# Patient Record
Sex: MALE | Race: WHITE | Employment: OTHER | URBAN - METROPOLITAN AREA
[De-identification: names, ages, dates, MRNs, and addresses within clinical notes are randomized per-mention and may not be internally consistent; named-entity substitution may affect disease eponyms.]

---

## 2017-01-10 ENCOUNTER — COMMUNICATION - HEALTHEAST (OUTPATIENT)
Dept: FAMILY MEDICINE | Facility: CLINIC | Age: 82
End: 2017-01-10

## 2017-01-23 ENCOUNTER — MYC MEDICAL ADVICE (OUTPATIENT)
Dept: FAMILY MEDICINE | Facility: CLINIC | Age: 82
End: 2017-01-23

## 2017-01-23 ENCOUNTER — MYC REFILL (OUTPATIENT)
Dept: FAMILY MEDICINE | Facility: CLINIC | Age: 82
End: 2017-01-23

## 2017-01-23 DIAGNOSIS — F33.2 MAJOR DEPRESSIVE DISORDER, RECURRENT, SEVERE WITHOUT PSYCHOTIC FEATURES (H): ICD-10-CM

## 2017-01-23 DIAGNOSIS — F33.2 MAJOR DEPRESSIVE DISORDER, RECURRENT, SEVERE WITHOUT PSYCHOTIC FEATURES (H): Primary | ICD-10-CM

## 2017-01-23 RX ORDER — METHYLPHENIDATE HYDROCHLORIDE 10 MG/1
10 TABLET ORAL 2 TIMES DAILY
Qty: 60 TABLET | Refills: 0 | Status: SHIPPED | OUTPATIENT
Start: 2017-01-23 | End: 2017-03-10

## 2017-01-23 RX ORDER — METHYLPHENIDATE HYDROCHLORIDE 10 MG/1
10 TABLET ORAL 2 TIMES DAILY
Qty: 60 TABLET | Refills: 0 | Status: CANCELLED | OUTPATIENT
Start: 2017-01-23

## 2017-01-23 NOTE — TELEPHONE ENCOUNTER
Message from MyChart:  Original authorizing provider: MD Felix Dumont would like a refill of the following medications:  methylphenidate (RITALIN) 10 MG tablet [Torres Mckeon MD]    Preferred pharmacy: [id number] - [place], [place] - [address] AT [facility]    Comment:

## 2017-02-27 DIAGNOSIS — F33.2 MAJOR DEPRESSIVE DISORDER, RECURRENT, SEVERE WITHOUT PSYCHOTIC FEATURES (H): ICD-10-CM

## 2017-03-10 RX ORDER — METHYLPHENIDATE HYDROCHLORIDE 10 MG/1
10 TABLET ORAL 2 TIMES DAILY
Qty: 60 TABLET | Refills: 0 | Status: SHIPPED | OUTPATIENT
Start: 2017-03-10 | End: 2017-04-10

## 2017-03-10 NOTE — TELEPHONE ENCOUNTER
[facility] phone call message- general phone call:    Reason for call: Patient calling regarding his prescription for Ritalin, methylphenidate. Patient stated he is unable to get out of bed and that his son Isrrael will pick it up when it is ready. Shana ADDISON was informed.     Return call needed: Yes    OK to leave a message on voice mail? Yes    Primary language: English      needed? No    Call taken on March 10, 2017 at 9:44 AM by Tala Stark

## 2017-03-10 NOTE — TELEPHONE ENCOUNTER
Controlled Substance Printed Prescription Pick-Up  Date: 03/10/17  Time: 1:15pm  Epic prescription number: [id number]  Medication name: Methylphenidate   Strength: 10mg  Prescription quantity: 60  Prescription picked up by: Isrrael Bangura     Relation to patient: son  Photo ID verified: no (No ID and pt already called to explain about it)  Second staff initial completed by: Deysi Santiago signature

## 2017-03-17 ENCOUNTER — COMMUNICATION - HEALTHEAST (OUTPATIENT)
Dept: FAMILY MEDICINE | Facility: CLINIC | Age: 82
End: 2017-03-17

## 2017-03-17 DIAGNOSIS — I10 ESSENTIAL HYPERTENSION WITH GOAL BLOOD PRESSURE LESS THAN 140/90: ICD-10-CM

## 2017-04-10 ENCOUNTER — MYC MEDICAL ADVICE (OUTPATIENT)
Dept: FAMILY MEDICINE | Facility: CLINIC | Age: 82
End: 2017-04-10

## 2017-04-10 DIAGNOSIS — F33.2 MAJOR DEPRESSIVE DISORDER, RECURRENT, SEVERE WITHOUT PSYCHOTIC FEATURES (H): ICD-10-CM

## 2017-04-11 RX ORDER — METHYLPHENIDATE HYDROCHLORIDE 10 MG/1
10 TABLET ORAL 2 TIMES DAILY
Qty: 60 TABLET | Refills: 0 | Status: SHIPPED | OUTPATIENT
Start: 2017-04-11 | End: 2017-05-16

## 2017-04-11 RX ORDER — METHYLPHENIDATE HYDROCHLORIDE 10 MG/1
10 TABLET ORAL 2 TIMES DAILY
Qty: 60 TABLET | Refills: 0 | Status: SHIPPED | OUTPATIENT
Start: 2017-04-11 | End: 2017-04-11

## 2017-05-16 ENCOUNTER — OFFICE VISIT (OUTPATIENT)
Dept: FAMILY MEDICINE | Facility: CLINIC | Age: 82
End: 2017-05-16

## 2017-05-16 VITALS
WEIGHT: 181 LBS | BODY MASS INDEX: 25.6 KG/M2 | TEMPERATURE: 97.4 F | SYSTOLIC BLOOD PRESSURE: 101 MMHG | RESPIRATION RATE: 18 BRPM | DIASTOLIC BLOOD PRESSURE: 67 MMHG | HEART RATE: 74 BPM | OXYGEN SATURATION: 95 %

## 2017-05-16 DIAGNOSIS — I10 ESSENTIAL HYPERTENSION: ICD-10-CM

## 2017-05-16 DIAGNOSIS — I35.0 AORTIC VALVE STENOSIS, UNSPECIFIED ETIOLOGY: ICD-10-CM

## 2017-05-16 DIAGNOSIS — F33.2 MAJOR DEPRESSIVE DISORDER, RECURRENT, SEVERE WITHOUT PSYCHOTIC FEATURES (H): Primary | ICD-10-CM

## 2017-05-16 DIAGNOSIS — Z23 NEED FOR PROPHYLACTIC VACCINATION WITH STREPTOCOCCUS PNEUMONIAE (PNEUMOCOCCUS) AND INFLUENZA VACCINES: ICD-10-CM

## 2017-05-16 DIAGNOSIS — C61 MALIGNANT NEOPLASM OF PROSTATE (H): ICD-10-CM

## 2017-05-16 DIAGNOSIS — R45.84 ANHEDONIA: ICD-10-CM

## 2017-05-16 DIAGNOSIS — R62.7 FAILURE TO THRIVE IN ADULT: ICD-10-CM

## 2017-05-16 LAB
ANION GAP SERPL CALCULATED.3IONS-SCNC: 15 MMOL/L (ref 5–18)
BUN SERPL-MCNC: 22 MG/DL (ref 8–28)
CALCIUM SERPL-MCNC: 9.2 MG/DL (ref 8.5–10.5)
CHLORIDE SERPL-SCNC: 102 MMOL/L (ref 98–107)
CO2 SERPL-SCNC: 18 MMOL/L (ref 22–31)
CREAT SERPL-MCNC: 1.13 MG/DL (ref 0.7–1.3)
GLUCOSE SERPL-MCNC: 132 MG/DL (ref 70–125)
POTASSIUM SERPL-SCNC: 4.3 MMOL/L (ref 3.5–5)
PSA SERPL-MCNC: 3 NG/ML (ref 0–6.5)
SODIUM SERPL-SCNC: 135 MMOL/L (ref 136–145)

## 2017-05-16 RX ORDER — METHYLPHENIDATE HYDROCHLORIDE 10 MG/1
10 TABLET ORAL 2 TIMES DAILY
Qty: 62 TABLET | Refills: 0 | Status: SHIPPED | OUTPATIENT
Start: 2017-05-16 | End: 2017-06-16

## 2017-05-16 RX ORDER — LOSARTAN POTASSIUM 50 MG/1
50 TABLET ORAL DAILY
Qty: 90 TABLET | Refills: 3 | Status: SHIPPED | OUTPATIENT
Start: 2017-05-16 | End: 2018-05-24

## 2017-05-16 RX ORDER — METHYLPHENIDATE HYDROCHLORIDE 10 MG/1
10 TABLET ORAL 2 TIMES DAILY
Qty: 62 TABLET | Refills: 0 | Status: SHIPPED | OUTPATIENT
Start: 2017-07-16 | End: 2017-08-22

## 2017-05-16 RX ORDER — METHYLPHENIDATE HYDROCHLORIDE 10 MG/1
10 TABLET ORAL 2 TIMES DAILY
Qty: 62 TABLET | Refills: 0 | Status: SHIPPED | OUTPATIENT
Start: 2017-06-16 | End: 2017-07-17

## 2017-05-16 NOTE — PROGRESS NOTES
"ROS  CONSTITUTIONAL:  NEGATIVE for fever, chills, change in weight  RESP:  NEGATIVE for significant cough or SOB  CV:  NEGATIVE for chest pain, palpitations or peripheral edema  GI:  NEGATIVE for nausea, abdominal pain, heartburn, or change in bowel habits  : NEGATIVE for dysuria, hematuria, and increased frequency.    PMH, Fam Hx, and Social Hx: Reviewed  Family History   Problem Relation Age of Onset     CANCER Father      skin     EYE* Mother      Glaucoma     Cardiovascular Sister        Vital Signs: Reviewed  Blood pressure (!) 80/60, pulse 75, temperature 97.4  F (36.3  C), temperature source Oral, resp. rate 18, weight 181 lb (82.1 kg), SpO2 95 %.  Body mass index is 25.6 kg/(m^2).     SUBJECTIVE:  This is an 83-yeare-old male in for chronic disease management.  He has major depressive disorder and anhedonia.  He has a history of hypertension but his blood pressure is very low.  He has prostate cancer.  His urologist would like a PSA checked.  He also has aortic valve stenosis that has been recently checked by his history through Cardiology and was told he does not need a valve replacement.   He continues to have \"lack of will.\"  He spends much of his day in bed and does not do any exercise.  He continues to use the methylphenidate or Ritalin 10 mg twice daily.  His home blood pressures run 130/90 on a consistent basis and he did not want me to reduce his medications.  He is on two prostate medications for prostate hypertrophy, Proscar and Flomax and would like him to check with his urologist to see if he should stay on those drugs.   OBJECTIVE:   APPEARANCE:  No acute distress, very flat affect and he gets angry easily when we start to discuss his blood pressure being too low.   NECK:  No jugular venous distention.   LUNGS:  Clear.   CARDIOVASCULAR:  Systolic ejection murmur consistent with his diagnosis of aortic stenosis.   ABDOMEN:  Soft, nontender.   EXTREMITIES:  Trace edema.   ASSESSMENT:   1.  Major " depressive disorder.   2.  Anhedonia.   3.  Hypertension, treated with low blood pressure today.     4.  Prostate cancer.   5.  Failure to thrive.   6.  Stable aortic stenosis.   7.  Health maintenance.     PLAN:  We will check a BMP today to see how his electrolytes and renal function are coming along and reduced his Cozaar to 50 mg daily.  I would like to discontinue all his blood pressure medications if he does not have a good record of elevated blood pressures from home.  I would like him to come back in 4-6 weeks with his home records.  If he starts getting dizzy he should call as we will need to reduce his blood pressure medications.  Will recheck it before he leaves today.  I have tried to update his medication list.  Will consider reducing or discontinuing his Cozaar, metoprolol, amlodipine and finasteride and tamsulosin.   I've refilled his Ritalin for the next 3 months and I have given him three one month prescriptions as he thinks it will work better for him rather than calling in monthly.  He continues with his fluoxetine.  I have asked him to check with his urologist to see if he should still be on both Proscar and Flomax.   I have referred him to a mental health group to send him to Psych to investigate transcranial magnetic stimulation which is something he is requesting.  He has found something on the [facility] website and thinks this may help his memory and depression.     The patient involved in medical decision making throughout the visit.   Will reevaluate in 4-6 weeks for his blood pressure.

## 2017-05-16 NOTE — MR AVS SNAPSHOT
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  We can check your machine readings with ours to be sure your machine is still accurate.    Medications ~  I am cutting your Cozaar medications dose in half and have discontinued Trazodone.  I have given you three prescriptions for your Ritalin to take to your pharmacy once a month for the next three months.     Lab Tests ~ Will order a PSA for [facility] as well as a test for the electrolytes in your blood.    Check with your urologist if you should be on both Proscar and Flomax.  Blood pressure was quite low today.    Your medication list is printed, please keep this with you, it is helpful to bring this current list to any other medical appointments, the emergency room or hospital.    If you had lab testing today and your results are reassuring or normal they will be be mailed to you within 7 days.     If the lab tests need quick action we will call you with the results.   The phone number we will call with results is # [phone] (home) . If this is not the best number please call our clinic and change the number.    If you need any refills please  call your pharmacy and they will contact us.    If you have any further concerns or wish to schedule another appointment you must call our office during normal business hours  [phone] (8-5:00 M-F)  If you have urgent medical questions that cannot wait  you may also call [phone] at any time of day.  If you have a medical emergency please call 911.    Thank you for coming to [facility].    PSA and BMP lab result faxed to [facility] on 5/19/17.  CI, CMA          Follow-ups after your visit        Additional Services     Mental Health Referral - [facility]       Use this form for behavioral health consults and assessments. The referral coordinator will help to determine whether patients are best served by clinic behavioral health staff or by community providers.    Type of referral(s) requested (indicate all that apply):  Adult Psychiatry--for diagnosis and medication management    Reason for referral: memory loss, would like to see if transcranial magnetic stimulation would help him.      Currently receiving mental health services (if 'Yes', what services and why today's referral?): No, was seening psychiatry. I have been prescribing his meds which have been stable. Has had some low blood pressures but states home BP is 130/90 on consistent basis    Currently having suicidal thoughts: No  Previous psych hospitalization: No    Please provide data for below screening tools if available.   PHQ-9 Score: 6  GAD7 Score: 0  PC-PTSD Score:  Bipolar Screen:  [place] (ADHD):   MCHAT (Autism Screen):   Pediatric Symptom Checklist (PSC):      needed: No  Language: English                  Who to contact     Please call your clinic at [phone] to:    Ask questions about your health    Make or cancel appointments    Discuss your medicines    Learn about your test results    Speak to your doctor   If you have compliments or concerns about an experience at your clinic, or if you wish to  file a complaint, please contact [facility] Patient Relations at [phone] or email us at [email or web address]         Additional Information About Your Visit        [email or web address] Information     [employer]ope gives you secure access to your electronic health record. If you see a primary care provider, you can also send messages to your care team and make appointments. If you have questions, please call your primary care clinic.  If you do not have a primary care provider, please call [phone] and they will assist you.      valuescope is an electronic gateway that provides easy, online access to your medical records. With [employer]cope, you can request a clinic appointment, read your test results, renew a prescription or communicate with your care team.     To access your existing account, please contact your [facility] Clinic or call [phone] for assistance.        Care EveryWhere ID     This is your Care EveryWhere ID. This could be used by other organizations to access your [facility] medical records  [id number]        Your Vitals Were     Pulse Temperature Respirations Pulse Oximetry BMI (Body Mass Index)       74 97.4  F (36.3  C) (Oral) 18 95% 25.6 kg/m2        Blood Pressure from Last 3 Encounters:   05/25/17 91/62   05/16/17 101/67   09/09/16 169/79    Weight from Last 3 Encounters:   05/25/17 177 lb (80.3 kg)   05/16/17 181 lb (82.1 kg)   04/20/16 189 lb 3.2 oz (85.8 kg)              We Performed the Following     ADMIN: Vaccine, Initial (73853)     Basic Metabolic Profile ([facility])     Mental Health Referral - [facility]     Pneumococcal vaccine 13 valent PCV13 IM (Prevnar) [08323]     PSA Diagnostic ([facility])          Today's Medication Changes          These changes are accurate as of: 5/16/17 11:59 PM.  If you have any questions, ask your nurse or doctor.               Start taking these medicines.        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 Read the directions carefully, and ask your doctor or other care provider to review them with you.      These medicines have changed or have updated prescriptions.        Dose/Directions    losartan 50 MG tablet   Commonly known as:  COZAAR   This may have changed:    - medication strength  - how much to take  - how to take this  - when to take this  - additional instructions   Used for:  Essential hypertension   Changed by:  Torres Mckeon MD        Dose:  50 mg   Take 1 tablet (50 mg) by mouth daily   Quantity:  90 tablet   Refills:  3         Stop taking these medicines if you haven't already. Please contact your care team if you have questions.     traZODone 50 MG tablet   Commonly known as:  DESYREL   Stopped by:  Torres Mckeon MD                Where to get your medicines      These [id number] - [place], [place] - [address] AT [facility]  [facility] [address]  [address], [place] [address]     Phone:  [phone]     losartan 50 MG tablet         Some of these will need a paper prescription and others can be bought over the counter.  Ask your nurse if you have questions.     Bring a paper prescription for each of these medications     methylphenidate 10 MG tablet    methylphenidate 10 MG tablet    methylphenidate 10 MG tablet                Primary Care Provider Office Phone # Fax #    Torres Mckeon [phone]       [facility] [address] [place] [id number]        Thank you!     Thank you for choosing [facility]  for your care. Our goal is always to provide you with excellent care. Hearing back from our patients is one way we can continue to improve our services. Please take a few minutes to complete the written survey that you may receive in the mail after your visit with us. Thank you!             Your Updated Medication List - Protect others around you: Learn how to safely use, store and throw away your medicines at www.disposemymeds.org.          This list is accurate as of: 5/16/17 11:59 PM.  Always use your most recent med list.                   Brand Name Dispense Instructions for use    amLODIPine 5 MG tablet    NORVASC    90 tablet    Take 1 tablet (5 mg) by mouth daily       finasteride 5 MG tablet    PROSCAR    90 tablet    Take 1 tablet (5 mg) by mouth daily       FLUoxetine 40 MG capsule    PROzac    90 capsule    Take 1 capsule (40 mg) by mouth daily       losartan 50 MG tablet    COZAAR    90 tablet    Take 1 tablet (50 mg) by mouth daily       * methylphenidate 10 MG tablet    RITALIN    62 tablet    Take 1 tablet (10 mg) by mouth 2 times daily       * methylphenidate 10 MG tablet   Start taking on:  6/16/2017    RITALIN    62 tablet    Take 1 tablet (10 mg) by mouth 2 times daily       * methylphenidate 10 MG tablet   Start taking on:  7/16/2017    RITALIN    62  tablet    Take 1 tablet (10 mg) by mouth 2 times daily       metoprolol 25 MG 24 hr tablet    TOPROL-XL    90 tablet    Take 1 tablet (25 mg) by mouth daily       nitroglycerin 0.4 MG sublingual tablet    NITROSTAT    25 tablet    Place 1 tablet (0.4 mg) under the tongue every 5 minutes as needed for chest pain       tamsulosin 0.4 MG capsule    FLOMAX    90 capsule    Take 1 tab by mouth daily. (pharmacy - please fill 90 day supply)       * Notice:  This list has 3 medication(s) that are the same as other medications prescribed for you. Read the directions carefully, and ask your doctor or other care provider to review them with you.

## 2017-05-16 NOTE — PATIENT INSTRUCTIONS
Blood Pressure ~ Please keep a record of your blood pressures over the next month and bring to clinic along with your blood pressure machine.  We can check your machine readings with ours to be sure your machine is still accurate.    Medications ~  I am cutting your Cozaar medications dose in half and have discontinued Trazodone.  I have given you three prescriptions for your Ritalin to take to your pharmacy once a month for the next three months.     Lab Tests ~ Will order a PSA for [facility] as well as a test for the electrolytes in your blood.    Check with your urologist if you should be on both Proscar and Flomax.  Blood pressure was quite low today.    Your medication list is printed, please keep this with you, it is helpful to bring this current list to any other medical appointments, the emergency room or hospital.    If you had lab testing today and your results are reassuring or normal they will be be mailed to you within 7 days.     If the lab tests need quick action we will call you with the results.   The phone number we will call with results is # [phone] (home) . If this is not the best number please call our clinic and change the number.    If you need any refills please call your pharmacy and they will contact us.    If you have any further concerns or wish to schedule another appointment you must call our office during normal business hours  [phone] (8-5:00 M-F)  If you have urgent medical questions that cannot wait  you may also call [phone] at any time of day.  If you have a medical emergency please call 511.    Thank you for coming to [facility].    PSA and BMP lab result faxed to [facility] on 5/19/17.  CI, CMA

## 2017-05-17 ASSESSMENT — PATIENT HEALTH QUESTIONNAIRE - PHQ9: SUM OF ALL RESPONSES TO PHQ QUESTIONS 1-9: 6

## 2017-05-18 ENCOUNTER — DOCUMENTATION ONLY (OUTPATIENT)
Dept: FAMILY MEDICINE | Facility: CLINIC | Age: 82
End: 2017-05-18

## 2017-05-18 NOTE — PROGRESS NOTES
Referral for (Test): Neuropsych Testing   Location/Place/Provider: [facility], [address], [address]  [place] [address]  Date/Time: 06/26/17 at 12:00pm   Phone: [phone]  Fax: [phone]  Additional information/prep.:   Scheduled by: SCOTT Jain

## 2017-05-18 NOTE — PROGRESS NOTES
Procedure Requested        9035     Mental Health Referral - [facility] [#[id number]]         Priority: Routine  Class: External referral         Comment:Use this form for behavioral health consults and assessments. The                  referral coordinator will help to determine whether patients are                  best served by clinic behavioral health staff or by community                  providers.                                    Type of referral(s) requested (indicate all that apply):                  Adult Psychiatry--for diagnosis and medication management                                    Reason for referral: memory loss, would like to see if                   transcranial magnetic stimulation would help him.                                                      Currently receiving mental health services (if 'Yes', what                   services and why today's referral?): No, was seening psychiatry. I                   have been prescribing his meds which have been stable. Has had                   some low blood pressures but states home BP is 130/90 on                   consistent basis                                    Currently having suicidal thoughts: No                  Previous psych hospitalization: No                                    Please provide data for below screening tools if available.                   PHQ-9 Score: 6                  GAD7 Score: 0                  PC-PTSD Score:                  Bipolar Screen:                  [place] (ADHD):                   MCHAT (Autism Screen):                   Pediatric Symptom Checklist (PSC):                                      needed: No                  Language: English       Associated Diagnoses         F33.2 Major depressive disorder, recurrent, severe without psychotic          features (H)               ADDY ALMAZAN          [id number]               : 1933  M      [address]                                        PCP: [id number] LAI [facility] [id number]                              CTR: [facility]

## 2017-05-22 NOTE — PROGRESS NOTES
"Felix,  Thank you for coming to clinic and my apologies for the delay in response with your results.  I have just returned from [facility].  Your sodium is a bit low and your glucose a bit high.  We will recheck both when we check your blood pressure again next month.    Your PSA is elevated compared to the last few years but is within the \"normal\" range.  It would be good for your urologist to see this and we will forward it.    See you in June.    WR"

## 2017-05-25 ENCOUNTER — OFFICE VISIT (OUTPATIENT)
Dept: FAMILY MEDICINE | Facility: CLINIC | Age: 82
End: 2017-05-25

## 2017-05-25 VITALS
TEMPERATURE: 97.3 F | OXYGEN SATURATION: 93 % | RESPIRATION RATE: 17 BRPM | BODY MASS INDEX: 25.04 KG/M2 | WEIGHT: 177 LBS | SYSTOLIC BLOOD PRESSURE: 91 MMHG | HEART RATE: 74 BPM | DIASTOLIC BLOOD PRESSURE: 62 MMHG

## 2017-05-25 DIAGNOSIS — J20.9 ACUTE BRONCHITIS, UNSPECIFIED ORGANISM: Primary | ICD-10-CM

## 2017-05-25 RX ORDER — AZITHROMYCIN 250 MG/1
TABLET, FILM COATED ORAL
Qty: 6 TABLET | Refills: 0 | Status: SHIPPED | OUTPATIENT
Start: 2017-05-25 | End: 2017-07-06

## 2017-05-25 NOTE — MR AVS SNAPSHOT
After Visit Summary   5/25/2017    Felix Bangura    MRN: [id number]           Patient Information     Date Of Birth          11/20/1933        Visit Information        Provider Department      5/25/2017 11:20 AM Torres Mckeon MD [facility]        Today's Diagnoses     Acute bronchitis, unspecified organism    -  1      Care Instructions    Cough ~ I am going to start you on an antibiotic called Azithromycin.  You will take 2 tablets on day one and then one tablet per day for days 3 to 5.  If not better shortly after you have completed the antibiotic I will need to see you again.    Your medication list is printed, please keep this with you, it is helpful to bring this current list to any other medical appointments, the emergency room or hospital.    If you had lab testing today and your results are reassuring or normal they will be be mailed to you within 7 days.     If the lab tests need quick action we will call you with the results.   The phone number we will call with results is # [phone] (home) . If this is not the best number please call our clinic and change the number.    If you need any refills please call your pharmacy and they will contact us.    If you have any further concerns or wish to schedule another appointment you must call our office during normal business hours  [phone] (8-5:00 M-F)  If you have urgent medical questions that cannot wait  you may also call [phone] at any time of day.  If you have a medical emergency please call [phone].    Thank you for coming to [facility].            Follow-ups after your visit        Who to contact     Please call your clinic at [phone] to:    Ask questions about your health    Make or cancel appointments    Discuss your medicines    Learn about your test results    Speak to your doctor   If you have compliments or concerns about an experience at your clinic, or if you wish to file a complaint, please  contact [facility] Patient Relations at [phone] or email us at [email or web address]         Additional Information About Your Visit        [employer] Information     [facility] gives you secure access to your electronic health record. If you see a primary care provider, you can also send messages to your care team and make appointments. If you have questions, please call your primary care clinic.  If you do not have a primary care provider, please call [phone] and they will assist you.      [facility] is an electronic gateway that provides easy, online access to your medical records. With [facility], you can request a clinic appointment, read your test results, renew a prescription or communicate with your care team.     To access your existing account, please contact your [facility] or call [phone] for assistance.        Care EveryWhere ID     This is your Care EveryWhere ID. This could be used by other organizations to access your [place] medical records  [id number]        Your Vitals Were     Pulse Temperature Respirations Pulse Oximetry BMI (Body Mass Index)       74 97.3  F (36.3  C) (Oral) 17 93% 25.04 kg/m2        Blood Pressure from Last 3 Encounters:   05/25/17 91/62   05/16/17 101/67   09/09/16 169/79    Weight from Last 3 Encounters:   05/25/17 177 lb (80.3 kg)   05/16/17 181 lb (82.1 kg)   04/20/16 189 lb 3.2 oz (85.8 kg)              Today, you had the following     No orders found for display         Today's Medication Changes          These changes are accurate as of: 5/25/17 12:16 PM.  If you have any questions, ask your nurse or doctor.               Start taking these medicines.        Dose/Directions    azithromycin 250 MG tablet   Commonly known as:  ZITHROMAX   Used for:  Acute bronchitis, unspecified organism   Started by:  Torres Mckeon MD        Two tablets first day, then one tablet daily for four days.    Quantity:  6 tablet   Refills:  0            Where to get your medicines      These medications were sent to [id number] - [place], [place] - [address] AT [facility] & [address], [place] [address]     Phone:  [phone]     azithromycin 250 MG tablet                Primary Care Provider Office Phone # Fax #    Torres Mckeon [phone]       [facility] [address] [facility] [address]        Thank you!     Thank you for choosing [facility]  for your care. Our goal is always to provide you with excellent care. Hearing back from our patients is one way we can continue to improve our services. Please take a few minutes to complete the written survey that you may receive in the mail after your visit with us. Thank you!             Your Updated Medication List - Protect others around you: Learn how to safely use, store and throw away your medicines at www.disposemymeds.org.          This list is accurate as of: 5/25/17 12:16 PM.  Always use your most recent med list.                   Brand Name Dispense Instructions for use    amLODIPine 5 MG tablet    NORVASC    90 tablet    Take 1 tablet (5 mg) by mouth daily       azithromycin 250 MG tablet    ZITHROMAX    6 tablet    Two tablets first day, then one tablet daily for four days.       finasteride 5 MG tablet    PROSCAR    90 tablet    Take 1 tablet (5 mg) by mouth daily       FLUoxetine 40 MG capsule    PROzac    90 capsule    Take 1 capsule (40 mg) by mouth daily       losartan 50 MG tablet    COZAAR    90 tablet    Take 1 tablet (50 mg) by mouth daily       * methylphenidate 10 MG tablet    RITALIN    62 tablet    Take 1 tablet (10 mg) by mouth 2 times daily       * methylphenidate 10 MG tablet   Start taking on:  6/16/2017    RITALIN    62 tablet    Take 1 tablet (10 mg) by mouth 2 times daily       * methylphenidate 10 MG tablet   Start taking on:   7/16/2017    RITALIN    62 tablet    Take 1 tablet (10 mg) by mouth 2 times daily       metoprolol 25 MG 24 hr tablet    TOPROL-XL    90 tablet    Take 1 tablet (25 mg) by mouth daily       nitroglycerin 0.4 MG sublingual tablet    NITROSTAT    25 tablet    Place 1 tablet (0.4 mg) under the tongue every 5 minutes as needed for chest pain       tamsulosin 0.4 MG capsule    FLOMAX    90 capsule    Take 1 tab by mouth daily. (pharmacy - please fill 90 day supply)       * Notice:  This list has 3 medication(s) that are the same as other medications prescribed for you. Read the directions carefully, and ask your doctor or other care provider to review them with you.

## 2017-05-25 NOTE — NURSING NOTE
DUE FOR:  PHQ9 given  Advance Directive    Patient did not bring medication list or bottles to the clinic today. Unable to check dose and SIG, as patient is unsure of correct medications they are taking.  Pt was told to bring medication and or bottles to clinic for next visit to review.

## 2017-05-25 NOTE — PATIENT INSTRUCTIONS
Cough ~ I am going to start you on an antibiotic called Azithromycin.  You will take 2 tablets on day one and then one tablet per day for days 3 to 5.  If not better shortly after you have completed the antibiotic I will need to see you again.    Your medication list is printed, please keep this with you, it is helpful to bring this current list to any other medical appointments, the emergency room or hospital.    If you had lab testing today and your results are reassuring or normal they will be be mailed to you within 7 days.     If the lab tests need quick action we will call you with the results.   The phone number we will call with results is # [phone] (home) . If this is not the best number please call our clinic and change the number.    If you need any refills please call your pharmacy and they will contact us.    If you have any further concerns or wish to schedule another appointment you must call our office during normal business hours  [phone] (8-5:00 M-F)  If you have urgent medical questions that cannot wait  you may also call [phone] at any time of day.  If you have a medical emergency please call [phone].    Thank you for coming to [facility].

## 2017-05-25 NOTE — PROGRESS NOTES
Chief Complaint   Patient presents with     Cough     non-productive for some time now with fatigue     BP 91/62 (BP Location: Right arm)  Pulse 74  Temp 97.3  F (36.3  C) (Oral)  Resp 17  Wt 177 lb (80.3 kg)  SpO2 93%  BMI 25.04 kg/m2    SUBJECTIVE:  This is an 83-year-old male in for check of cough that has been increasing over the past 4-5 days.  He says it is nonproductive.  His wife says that while he is sleeping his lungs gurgle and this has been over the past 4 nights.  He has not had fever or chills.  There has been no illness in the family.  He has not been exposed to any illness that he is aware of.   He is taking fluids and continues with his current medications.   OBJECTIVE:   APPEARANCE:  No acute distress and is more interactive than he has been in some visits.  O2 sat is 93%.   CHEST:  No retractions noted.   LUNGS:  Clear, anterior and superior posterior areas on his right lower lobe posteriorly there are rales.   ASSESSMENT:     1.  Probable bronchitis.   PLAN:  Azithromycin 250 mg 2 tablets day 1 one tablet days 2 through 5.  Recheck if not resolving or if the gurgling persists at night and will consider chest x-ray.   The patient and wife involved in medical decision making throughout the visit.   Recheck if symptoms are not resolved.

## 2017-06-05 ENCOUNTER — MEDICAL CORRESPONDENCE (OUTPATIENT)
Dept: HEALTH INFORMATION MANAGEMENT | Facility: CLINIC | Age: 82
End: 2017-06-05

## 2017-06-05 ENCOUNTER — TRANSFERRED RECORDS (OUTPATIENT)
Dept: HEALTH INFORMATION MANAGEMENT | Facility: CLINIC | Age: 82
End: 2017-06-05

## 2017-06-06 ENCOUNTER — TELEPHONE (OUTPATIENT)
Dept: FAMILY MEDICINE | Facility: CLINIC | Age: 82
End: 2017-06-06

## 2017-06-06 NOTE — TELEPHONE ENCOUNTER
[facility] phone call message- general phone call:    Reason for call: Patient is looking at his diagnosis list and not sure what it means so he would like to speak to someone about it. Told him it could take up to two business days for a response. Please call and advise.      Return call needed: Yes    OK to leave a message on voice mail? Yes    Primary language: English      needed? No    Call taken on June 6, 2017 at 4:04 PM by Doug Carreon

## 2017-06-08 NOTE — TELEPHONE ENCOUNTER
"Called.  Did not understand \"failure to thrive as an adult\"  Described and he understood.  No additional questions.  WR  "

## 2017-07-06 ENCOUNTER — OFFICE VISIT (OUTPATIENT)
Dept: FAMILY MEDICINE | Facility: CLINIC | Age: 82
End: 2017-07-06

## 2017-07-06 VITALS
BODY MASS INDEX: 26.96 KG/M2 | HEART RATE: 66 BPM | HEIGHT: 69 IN | RESPIRATION RATE: 19 BRPM | DIASTOLIC BLOOD PRESSURE: 64 MMHG | SYSTOLIC BLOOD PRESSURE: 96 MMHG | WEIGHT: 182 LBS | OXYGEN SATURATION: 94 % | TEMPERATURE: 97.6 F

## 2017-07-06 DIAGNOSIS — I10 ESSENTIAL HYPERTENSION: Primary | ICD-10-CM

## 2017-07-06 DIAGNOSIS — G47.00 INSOMNIA, UNSPECIFIED TYPE: ICD-10-CM

## 2017-07-06 RX ORDER — TRAZODONE HYDROCHLORIDE 50 MG/1
25 TABLET, FILM COATED ORAL AT BEDTIME
Qty: 45 TABLET | Refills: 3 | Status: SHIPPED | OUTPATIENT
Start: 2017-07-06 | End: 2018-06-14

## 2017-07-06 NOTE — PATIENT INSTRUCTIONS
Blood Pressure ~ Your readings seem to be good for the majority of the time according to the history in your machine.  Your machine and ours are very comparable with the readings today.      Medications ~Please continue to take your medications as prescribed.  I have renewed your Trazodone, 1/2 tablet at bedtime to help you sleep.  You can pick this up at your pharmacy.    Continue to take your blood pressures at home.  Follow up with me in clinic if your pressures go out of whack.    Your medication list is printed, please keep this with you, it is helpful to bring this current list to any other medical appointments, the emergency room or hospital.    If you had lab testing today and your results are reassuring or normal they will be be mailed to you within 7 days.     If the lab tests need quick action we will call you with the results.   The phone number we will call with results is # [phone] (home) . If this is not the best number please call our clinic and change the number.    If you need any refills please call your pharmacy and they will contact us.    If you have any further concerns or wish to schedule another appointment you must call our office during normal business hours  [phone] (8-5:00 M-F)  If you have urgent medical questions that cannot wait  you may also call [phone] at any time of day.  If you have a medical emergency please call [phone].    Thank you for coming to [facility].

## 2017-07-06 NOTE — PROGRESS NOTES
"Chief Complaint   Patient presents with     RECHECK     blood pressure and compare to his machine     BP 96/64 (BP Location: Right arm, Patient Position: Chair, Cuff Size: Adult Regular)  Pulse 66  Temp 97.6  F (36.4  C) (Oral)  Resp 19  Ht 5' 9\" (175.3 cm)  Wt 182 lb (82.6 kg)  SpO2 94%  BMI 26.88 kg/m2    PHQ9 = 11    SUBJECTIVE:  This is an 83-year-old male in for check of cough and blood pressure. Cough resolved.  His blood pressure us normal to high at home. Brought his device to visit and measures with his device and ours are equal.      He is taking fluids and continues with his current medications.   Complains of lack of will. Did not go to Psych visit because it was too far away and it was listed as Memory problems  OBJECTIVE:   APPEARANCE:  No acute distress and is interactive.  O2 sat is 94%.   LUNGS:  Clear  CV: Reg rythmn    ASSESSMENT:   (I10) Essential hypertension  (primary encounter diagnosis)  Plan: Continue current medications    (G47.00) Insomnia, unspecified type  Comment: Still has sleep difficulty want to restart previous med.  Plan: traZODone (DESYREL) 50 MG tablet 1/2 tablet at HS          Bronchitis resolved  The patient involved in medical decision making throughout the visit.   Recheck if symptoms are not resolved.        "

## 2017-07-06 NOTE — MR AVS SNAPSHOT
After Visit Summary   7/6/2017    Felix Bangura    MRN: [id number]           Patient Information     Date Of Birth          11/20/1933        Visit Information        Provider Department      7/6/2017 11:40 AM Torres Mckeon MD [facility]        Today's Diagnoses     Essential hypertension    -  1    Insomnia, unspecified type          Care Instructions    Blood Pressure ~ Your readings seem to be good for the majority of the time according to the history in your machine.  Your machine and ours are very comparable with the readings today.      Medications ~Please continue to take your medications as prescribed.  I have renewed your Trazodone, 1/2 tablet at bedtime to help you sleep.  You can pick this up at your pharmacy.    Continue to take your blood pressures at home.  Follow up with me in clinic if your pressures go out of whack.    Your medication list is printed, please keep this with you, it is helpful to bring this current list to any other medical appointments, the emergency room or hospital.    If you had lab testing today and your results are reassuring or normal they will be be mailed to you within 7 days.     If the lab tests need quick action we will call you with the results.   The phone number we will call with results is # [phone] (home) . If this is not the best number please call our clinic and change the number.    If you need any refills please call your pharmacy and they will contact us.    If you have any further concerns or wish to schedule another appointment you must call our office during normal business hours  [phone] (8-5:00 M-F)  If you have urgent medical questions that cannot wait  you may also call [phone] at any time of day.  If you have a medical emergency please call [phone].    Thank you for coming to [facility].            Follow-ups after your visit        Who to contact     Please call your clinic at [phone] to:    Ask  "questions about your health    Make or cancel appointments    Discuss your medicines    Learn about your test results    Speak to your doctor   If you have compliments or concerns about an experience at your clinic, or if you wish to file a complaint, please contact [facility] Patient Relations at [phone] or email us at [email or web address]         Additional Information About Your Visit        [employer] Information     [employer] gives you secure access to your electronic health record. If you see a primary care provider, you can also send messages to your care team and make appointments. If you have questions, please call your primary care clinic.  If you do not have a primary care provider, please call [phone] and they will assist you.      [employer] is an electronic gateway that provides easy, online access to your medical records. With [employer], you can request a clinic appointment, read your test results, renew a prescription or communicate with your care team.     To access your existing account, please contact your [facility] or call [phone] for assistance.        Care EveryWhere ID     This is your Care EveryWhere ID. This could be used by other organizations to access your [facility] medical records  [id number]        Your Vitals Were     Pulse Temperature Respirations Height Pulse Oximetry BMI (Body Mass Index)    66 97.6  F (36.4  C) (Oral) 19 5' 9\" (175.3 cm) 94% 26.88 kg/m2       Blood Pressure from Last 3 Encounters:   07/06/17 96/64   05/25/17 91/62   05/16/17 101/67    Weight from Last 3 Encounters:   07/06/17 182 lb (82.6 kg)   05/25/17 177 lb (80.3 kg)   05/16/17 181 lb (82.1 kg)              Today, you had the following     No orders found for display         Today's Medication Changes          These changes are accurate as of: 7/6/17 12:13 PM.  If you have any questions, ask your nurse or doctor.               Start " taking these medicines.        Dose/Directions    traZODone 50 MG tablet   Commonly known as:  DESYREL   Used for:  Insomnia, unspecified type   Started by:  Torres Mckeon MD        Dose:  25 mg   Take 0.5 tablets (25 mg) by mouth At Bedtime   Quantity:  45 tablet   Refills:  3         Stop taking these medicines if you haven't already. Please contact your care team if you have questions.     azithromycin 250 MG tablet   Commonly known as:  ZITHROMAX   Stopped by:  Torres Mckeon MD                Where to get your medicines      These medications were sent to [id number] - [place], [place] - [address] [place] [address] & [facility]  [address], [place] [address]     Phone:  [phone]     traZODone 50 MG tablet                Primary Care Provider Office Phone # Fax #    Torres Mckeon [phone]       [facility] [address] [facility] [address]        Equal Access to Services     [facility] AH: Hadii aad ku hadasho Soomaali, waaxda luqadaha, qaybta kaalmada adeegyada, waxay idiin hayaan ernestine preciado . So [facility] [phone].    ATENCIÓN: Si habla español, tiene a husain disposición servicios gratuitos de asistencia lingüística. Ll[facility] [phone].    We comply with applicable federal civil rights laws and [place] laws. We do not discriminate on the basis of race, color, national origin, age, disability sex, sexual orientation or gender identity.            Thank you!     Thank you for choosing [facility]  for your care. Our goal is always to provide you with excellent care. Hearing back from our patients is one way we can continue to improve our services. Please take a few minutes to complete the written survey that you may receive in the mail after your visit with us. Thank you!             Your Updated Medication List - Protect others around you: Learn how to safely use, store and throw away your  medicines at www.disposemymeds.org.          This list is accurate as of: 7/6/17 12:13 PM.  Always use your most recent med list.                   Brand Name Dispense Instructions for use Diagnosis    amLODIPine 5 MG tablet    NORVASC    90 tablet    Take 1 tablet (5 mg) by mouth daily    Essential hypertension with goal blood pressure less than 140/90       FLUoxetine 40 MG capsule    PROzac    90 capsule    Take 1 capsule (40 mg) by mouth daily    Dysthymia       losartan 50 MG tablet    COZAAR    90 tablet    Take 1 tablet (50 mg) by mouth daily    Essential hypertension       * methylphenidate 10 MG tablet    RITALIN    62 tablet    Take 1 tablet (10 mg) by mouth 2 times daily    Major depressive disorder, recurrent, severe without psychotic features (H)       * methylphenidate 10 MG tablet   Start taking on:  7/16/2017    RITALIN    62 tablet    Take 1 tablet (10 mg) by mouth 2 times daily    Major depressive disorder, recurrent, severe without psychotic features (H)       metoprolol 25 MG 24 hr tablet    TOPROL-XL    90 tablet    Take 1 tablet (25 mg) by mouth daily    Essential hypertension       nitroGLYcerin 0.4 MG sublingual tablet    NITROSTAT    25 tablet    Place 1 tablet (0.4 mg) under the tongue every 5 minutes as needed for chest pain    Angina at rest (H)       tamsulosin 0.4 MG capsule    FLOMAX    90 capsule    Take 1 tab by mouth daily. (pharmacy - please fill 90 day supply)    BPH (benign prostatic hypertrophy) with urinary retention       traZODone 50 MG tablet    DESYREL    45 tablet    Take 0.5 tablets (25 mg) by mouth At Bedtime    Insomnia, unspecified type       * Notice:  This list has 2 medication(s) that are the same as other medications prescribed for you. Read the directions carefully, and ask your doctor or other care provider to review them with you.

## 2017-07-07 ASSESSMENT — PATIENT HEALTH QUESTIONNAIRE - PHQ9: SUM OF ALL RESPONSES TO PHQ QUESTIONS 1-9: 11

## 2017-07-18 ENCOUNTER — AMBULATORY - HEALTHEAST (OUTPATIENT)
Dept: CARDIOLOGY | Facility: CLINIC | Age: 82
End: 2017-07-18

## 2017-07-18 DIAGNOSIS — I35.0 AORTIC STENOSIS: ICD-10-CM

## 2017-07-20 ENCOUNTER — COMMUNICATION - HEALTHEAST (OUTPATIENT)
Dept: CARDIOLOGY | Facility: CLINIC | Age: 82
End: 2017-07-20

## 2017-07-31 ENCOUNTER — OFFICE VISIT (OUTPATIENT)
Dept: FAMILY MEDICINE | Facility: CLINIC | Age: 82
End: 2017-07-31

## 2017-07-31 VITALS
HEART RATE: 83 BPM | WEIGHT: 181 LBS | OXYGEN SATURATION: 91 % | BODY MASS INDEX: 28.41 KG/M2 | HEIGHT: 67 IN | SYSTOLIC BLOOD PRESSURE: 103 MMHG | TEMPERATURE: 97.4 F | DIASTOLIC BLOOD PRESSURE: 66 MMHG | RESPIRATION RATE: 26 BRPM

## 2017-07-31 DIAGNOSIS — J18.9 ATYPICAL PNEUMONIA: ICD-10-CM

## 2017-07-31 DIAGNOSIS — R05.9 COUGH: Primary | ICD-10-CM

## 2017-07-31 RX ORDER — AZITHROMYCIN 250 MG/1
TABLET, FILM COATED ORAL
Qty: 6 TABLET | Refills: 0 | Status: SHIPPED | OUTPATIENT
Start: 2017-07-31 | End: 2018-07-19

## 2017-07-31 NOTE — MR AVS SNAPSHOT
After Visit Summary   7/31/2017    Felix Bangura    MRN: [id number]           Patient Information     Date Of Birth          11/20/1933        Visit Information        Provider Department      7/31/2017 3:00 PM Jerod Herrera MD [facility]        Today's Diagnoses     Cough    -  1    Atypical pneumonia           Follow-ups after your visit        Follow-up notes from your care team     Return if symptoms worsen or fail to improve.      Who to contact     Please call your clinic at [phone] to:    Ask questions about your health    Make or cancel appointments    Discuss your medicines    Learn about your test results    Speak to your doctor   If you have compliments or concerns about an experience at your clinic, or if you wish to file a complaint, please contact [facility] Patient Relations at [phone] or email us at [email or web address]         Additional Information About Your Visit        MyChart Information     [employer] gives you secure access to your electronic health record. If you see a primary care provider, you can also send messages to your care team and make appointments. If you have questions, please call your primary care clinic.  If you do not have a primary care provider, please call [phone] and they will assist you.      [employer] is an electronic gateway that provides easy, online access to your medical records. With [employer], you can request a clinic appointment, read your test results, renew a prescription or communicate with your care team.     To access your existing account, please contact your [facility] or call [phone] for assistance.        Care EveryWhere ID     This is your Care EveryWhere ID. This could be used by other organizations to access your [facility] medical records  [id number]        Your Vitals Were     Pulse Temperature Respirations Height Pulse Oximetry  "BMI (Body Mass Index)    83 97.4  F (36.3  C) (Oral) 26 5' 7\" (170.2 cm) 91% 28.35 kg/m2       Blood Pressure from Last 3 Encounters:   07/31/17 103/66   07/06/17 96/64   05/25/17 91/62    Weight from Last 3 Encounters:   07/31/17 181 lb (82.1 kg)   07/06/17 182 lb (82.6 kg)   05/25/17 177 lb (80.3 kg)              We Performed the Following     XR CHEST 2 VW          Today's Medication Changes          These changes are accurate as of: 7/31/17 11:59 PM.  If you have any questions, ask your nurse or doctor.               Start taking these medicines.        Dose/Directions    azithromycin 250 MG tablet   Commonly known as:  ZITHROMAX   Used for:  Cough, Atypical pneumonia   Started by:  Jerod Herrera MD        Two tablets first day, then one tablet daily for four days.   Quantity:  6 tablet   Refills:  0            Where to get your medicines      These medications were sent to [id number] - [place], [place] - [address] AT [facility] [address], [place] [address]     Phone:  [phone]     azithromycin 250 MG tablet                Primary Care Provider Office Phone # Fax #    Torres Mckeon [phone]       [facility] [address] [place] [address]        Equal Access to Services     LILO HAMMER AH: Hadii diana ku hadasho Soomaali, waaxda luqadaha, qaybta kaalmada adeegyada, noha trimble. So [facility] [phone].    ATENCIÓN: Si habla español, tiene a husain disposición servicios gratuitos de asistencia lingüística. Llame al [phone].    We comply with applicable federal civil rights laws and [place] laws. We do not discriminate on the basis of race, color, national origin, age, disability sex, sexual orientation or gender identity.            Thank you!     Thank you for choosing [facility]  for your care. Our goal is always to provide you with excellent care. " Hearing back from our patients is one way we can continue to improve our services. Please take a few minutes to complete the written survey that you may receive in the mail after your visit with us. Thank you!             Your Updated Medication List - Protect others around you: Learn how to safely use, store and throw away your medicines at www.disposemymeds.org.          This list is accurate as of: 7/31/17 11:59 PM.  Always use your most recent med list.                   Brand Name Dispense Instructions for use Diagnosis    amLODIPine 5 MG tablet    NORVASC    90 tablet    Take 1 tablet (5 mg) by mouth daily    Essential hypertension with goal blood pressure less than 140/90       azithromycin 250 MG tablet    ZITHROMAX    6 tablet    Two tablets first day, then one tablet daily for four days.    Cough, Atypical pneumonia       FLUoxetine 40 MG capsule    PROzac    90 capsule    Take 1 capsule (40 mg) by mouth daily    Dysthymia       losartan 50 MG tablet    COZAAR    90 tablet    Take 1 tablet (50 mg) by mouth daily    Essential hypertension       methylphenidate 10 MG tablet    RITALIN    62 tablet    Take 1 tablet (10 mg) by mouth 2 times daily    Major depressive disorder, recurrent, severe without psychotic features (H)       metoprolol 25 MG 24 hr tablet    TOPROL-XL    90 tablet    Take 1 tablet (25 mg) by mouth daily    Essential hypertension       nitroGLYcerin 0.4 MG sublingual tablet    NITROSTAT    25 tablet    Place 1 tablet (0.4 mg) under the tongue every 5 minutes as needed for chest pain    Angina at rest (H)       tamsulosin 0.4 MG capsule    FLOMAX    90 capsule    Take 1 tab by mouth daily. (pharmacy - please fill 90 day supply)    BPH (benign prostatic hypertrophy) with urinary retention       traZODone 50 MG tablet    DESYREL    45 tablet    Take 0.5 tablets (25 mg) by mouth At Bedtime    Insomnia, unspecified type

## 2017-07-31 NOTE — PROGRESS NOTES
Preceptor Attestation:  Patient's case reviewed and discussed with Jerod Herrera MD Patient seen and discussed with the resident.. I agree with assessment and plan of care.  Supervising Physician:  Minesh Dickerson MD  [facility]

## 2017-07-31 NOTE — PROGRESS NOTES
"       Subjective       Felix Bangura is a 83 year old  male with a significant past medical history of ANA, diastolic dysfunction, Aortic stenosis who presents for the new concern(s) of    1. May have pneumonia  - Wife was diagnosed with a pneumonia and an URI about 1 week ago  - Coughing, new for 1 week  - No fevers  - no swelling  - Dry Cough  - Pneumonia 1 month ago, azithromycin at that time seemed to work    2. Eye drainage  - Concerned for an eye infection - wife also diagnosed with an eye infection  - Mentions poor hand hygiene and frequent touching of his eyes  - Notes thick eye drainage every once in a while, but not worse recently  - No worsening of vision or eye pain  - No bumps or lumps noted  - Today is not too bad compared to other days    Pertinent ROS: Constitutional, HEENT, cardiovascular, pulmonary, gi and gu systems are negative, except as otherwise noted.           Objective   Vitals:    07/31/17 1515   BP: 103/66   Pulse: 83   Resp: 26   Temp: 97.4  F (36.3  C)   TempSrc: Oral   SpO2: 91%   Weight: 181 lb (82.1 kg)   Height: 5' 7\" (170.2 cm)     Body mass index is 28.35 kg/(m^2).    GENERAL APPEARANCE: healthy, alert and no distress  EYES: Eyes grossly normal to inspection, PERRL and conjunctivae and sclerae normal  HENT: ear canals and TM's normal, nose and mouth without ulcers or lesions, oropharynx clear and oral mucous membranes moist  NECK: no adenopathy, no asymmetry, masses, or scars and thyroid normal to palpation  RESP: right base with faint crackles, left base clear, good air entry  CV: regular rates and rhythm, normal S1 S2, no S3 or S4, no murmur, click or rub, no peripheral edema and peripheral pulses strong  ABDOMEN: soft, nontender, no hepatosplenomegaly, no masses and bowel sounds normal  MS: gait is age appropriate without ataxia  SKIN: no suspicious lesions or rashes  PSYCH: blunted affect           Assessment/Plan       (R05) Cough  (primary encounter diagnosis), (J18.9) " Atypical pneumonia  Comment:   Plan: XR CHEST 2 VW, azithromycin (ZITHROMAX) 250 MG         tablet   With a close contact with diagnosis of atypical pneumonia, will treat Felix.  Z-massimo given since he has had good relief from this in the past.  With regards to the eye drainage concern, none seen on todays exam that would make me concerned for a bacterial conjunctivitis.  Will not treat with eye drops.  Encouraged good hand hygiene, as well as warm compresses and saline eye drops as needed.    Options for treatment and follow-up care were reviewed with the patient and/or guardian. Felix TUCKER Bangura and/or guardian engaged in the decision making process and verbalized understanding of the options discussed and agreed with the final plan.    Jerod Herrera MD      Precepted today with: Minesh Dickerson MD

## 2017-08-02 ASSESSMENT — PATIENT HEALTH QUESTIONNAIRE - PHQ9: SUM OF ALL RESPONSES TO PHQ QUESTIONS 1-9: 10

## 2017-08-15 ENCOUNTER — DOCUMENTATION ONLY (OUTPATIENT)
Dept: FAMILY MEDICINE | Facility: CLINIC | Age: 82
End: 2017-08-15

## 2017-08-22 ENCOUNTER — MYC MEDICAL ADVICE (OUTPATIENT)
Dept: FAMILY MEDICINE | Facility: CLINIC | Age: 82
End: 2017-08-22

## 2017-08-22 DIAGNOSIS — F33.2 MAJOR DEPRESSIVE DISORDER, RECURRENT, SEVERE WITHOUT PSYCHOTIC FEATURES (H): ICD-10-CM

## 2017-08-23 ENCOUNTER — TELEPHONE (OUTPATIENT)
Dept: FAMILY MEDICINE | Facility: CLINIC | Age: 82
End: 2017-08-23

## 2017-08-23 RX ORDER — METHYLPHENIDATE HYDROCHLORIDE 10 MG/1
10 TABLET ORAL 2 TIMES DAILY
Qty: 62 TABLET | Refills: 0 | Status: SHIPPED | OUTPATIENT
Start: 2017-08-23 | End: 2018-07-19

## 2017-08-23 NOTE — TELEPHONE ENCOUNTER
[facility] phone call message- general phone call:    Reason for call: Patient is calling asking to schedule appt with Paula George, he states he talked with Dr. Mckeon and he gave him Paula's name. He states Dr. Mckeon had referred him to see a psychologist elsewhere but it's too far so he would like to come here instead. Told him that we would need an order and talk with Paula first and will give him a call back. Please call and advise.     Return call needed: Yes    OK to leave a message on voice mail?     Primary language: English      needed? No    Call taken on August 23, 2017 at 10:11 AM by Doug Carreon

## 2017-08-31 NOTE — TELEPHONE ENCOUNTER
The short answer to this is that I cannot see him in my mental health clinic because I am not on his insurance panel. We had wanted this patient to get neuropsych testing because of some memory concerns and his request for a specialized depression treatment option (TMS).  Dr. Mckeon and I will discuss this afternoon when he precepts.

## 2017-09-01 NOTE — TELEPHONE ENCOUNTER
Dr. Mckeon and I consulted on this patient. Because I cannot see him due to Medicare, we would like to refer him to [facility]. He can receive treatment for depression there. We will help him schedule this appointment. When Souk returns next week, I will have him assist with this.   [address] [place] [address] [address], [place] [address]  [phone]

## 2017-09-01 NOTE — PROGRESS NOTES
"Visit to the client's home for annual health risk assessment.     Current situation/living environment/hospitalization: Client is an 83 year old male who lives with his wife in a single family home, with their three children, two of them adult children (college-aged).  His spouse runs a day care out of their home as well.  He spends much of his time at home on the couch. He use to spend time in the basement working on the computer, but has not done this for over a year.  In the last 6 months, he has not had any ER/Hospital visits and has sustained no falls, though is a fall risk at times.    Activities of daily living (ADL)/instrumental activities of daily living (IADL) and functional issues: Felix is needing assistance with ADL's, such as bathing, dressing, grooming, and toileting. Last year, he needed more help to ambulate and transfer. I observed him today able to do both of those on his own. In regards to IADL's, he gets support with cooking, cleaning, laundry, errands, and driving. He does not leave his house unless for medical appointments and has friends and family come there to visit him.    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 Continues to follow up with both his PCP and his urologist for his PSA numbers.    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 He asked me about trans-cranial stimulation as a therapy. He was to follow up with the clinic about this.            Additional info: CM will continue to monitor and reassess in 6 months, sooner if needed. Client and spouse understand to call CM in the interim should the need arise.    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 His spouse, Luz, son Isrrael and DESI are employed by him to provide care.Also gets DME through [employer], such as incontinence " products.    Lyndsey Winkler, [facility] [place]  [phone]

## 2017-09-05 ENCOUNTER — TELEPHONE (OUTPATIENT)
Dept: FAMILY MEDICINE | Facility: CLINIC | Age: 82
End: 2017-09-05

## 2017-09-05 NOTE — TELEPHONE ENCOUNTER
No detail on what kind of lab this is that order is being requested for.  Chart reviewed and unable to tell.     Called patient to gather information. No answer. Left message to return call regarding what which lab that an order is being requested for.

## 2017-09-05 NOTE — TELEPHONE ENCOUNTER
[facility] phone call message- general phone call:    Reason for call: Patient and son called to schedule a lab visit only for pt but there is no order for lab in chart. Patient will like to hear back from Dr. Mckeon if Dr. Mckeon can put in a lab visit by tomorrow because patient will like to get in tomorrow.     Return call needed: Yes    OK to leave a message on voice mail? Yes    Primary language: English      needed? No    Call taken on September 5, 2017 at 4:09 PM by Tala Stark

## 2017-09-06 ENCOUNTER — DOCUMENTATION ONLY (OUTPATIENT)
Dept: FAMILY MEDICINE | Facility: CLINIC | Age: 82
End: 2017-09-06

## 2017-09-06 DIAGNOSIS — C61 PROSTATE CANCER (H): Primary | ICD-10-CM

## 2017-09-06 LAB — PSA SERPL-MCNC: 0.4 NG/ML (ref 0–6.5)

## 2017-09-06 NOTE — PROGRESS NOTES
Use this form for behavioral health consults and assessments. The referral coordinator will help to determine whether patients are best served by clinic behavioral health staff or by community providers.    Type of referral(s) requested (indicate all that apply):  Adult Psychiatry--for diagnosis and medication management    Reason for referral: memory loss, would like to see if transcranial magnetic stimulation would help him.      Currently receiving mental health services (if 'Yes', what services and why today's referral?): No, was seening psychiatry. I have been prescribing his meds which have been stable. Has had some low blood pressures but states home BP is 130/90 on consistent basis    Currently having suicidal thoughts: No  Previous psych hospitalization: No    Please provide data for below screening tools if available.   PHQ-9 Score: 6  GAD7 Score: 0  PC-PTSD Score:  Bipolar Screen:  Hernandez (ADHD):   MCHAT (Autism Screen):   Pediatric Symptom Checklist (PSC):

## 2017-09-06 NOTE — MR AVS SNAPSHOT
After Visit Summary   9/6/2017    Felix Bangura    MRN: [id number]           Patient Information     Date Of Birth          11/20/1933        Visit Information        Provider Department      9/6/2017 1:30 PM PV P LAB [facility]        Today's Diagnoses     Prostate cancer (H)    -  1       Follow-ups after your visit        Future tests that were ordered for you today     Open Future Orders        Priority Expected Expires Ordered    PSA Diagnostic ([facility]) Routine  9/13/2017 9/6/2017            Who to contact     Please call your clinic at [phone] to:    Ask questions about your health    Make or cancel appointments    Discuss your medicines    Learn about your test results    Speak to your doctor   If you have compliments or concerns about an experience at your clinic, or if you wish to file a complaint, please contact [facility] Patient Relations at [phone] or email us at [email or web address]         Additional Information About Your Visit        MyChart Information     [employer] gives you secure access to your electronic health record. If you see a primary care provider, you can also send messages to your care team and make appointments. If you have questions, please call your primary care clinic.  If you do not have a primary care provider, please call [phone] and they will assist you.      [employer] is an electronic gateway that provides easy, online access to your medical records. With [employer], you can request a clinic appointment, read your test results, renew a prescription or communicate with your care team.     To access your existing account, please contact your [facility] or call [phone] for assistance.        Care EveryWhere ID     This is your Care EveryWhere ID. This could be used by other organizations to access your [place] medical records  [id number]         Blood Pressure  from Last 3 Encounters:   07/31/17 103/66   07/06/17 96/64   05/25/17 91/62    Weight from Last 3 Encounters:   07/31/17 181 lb (82.1 kg)   07/06/17 182 lb (82.6 kg)   05/25/17 177 lb (80.3 kg)               Primary Care Provider Office Phone # Fax #    Torres Mckeon [phone]       [facility] [address] [place] [address]        Equal Access to Services     [facility]: Hadii aad ku hadasho Soomaali, waaxda luqadaha, qaybta kaalmada adeegyada, waxay jackiein hayaan ernestine preciado . So [facility] [phone].    ATENCIÓN: Si habla español, tiene a husain disposición servicios gratuitos de asistencia lingüística. Lledwin al [phone].    We comply with applicable federal civil rights laws and [place] laws. We do not discriminate on the basis of race, color, national origin, age, disability sex, sexual orientation or gender identity.            Thank you!     Thank you for choosing [facility]  for your care. Our goal is always to provide you with excellent care. Hearing back from our patients is one way we can continue to improve our services. Please take a few minutes to complete the written survey that you may receive in the mail after your visit with us. Thank you!             Your Updated Medication List - Protect others around you: Learn how to safely use, store and throw away your medicines at www.disposemymeds.org.          This list is accurate as of: 9/6/17  1:42 PM.  Always use your most recent med list.                   Brand Name Dispense Instructions for use Diagnosis    amLODIPine 5 MG tablet    NORVASC    90 tablet    Take 1 tablet (5 mg) by mouth daily    Essential hypertension with goal blood pressure less than 140/90       azithromycin 250 MG tablet    ZITHROMAX    6 tablet    Two tablets first day, then one tablet daily for four days.    Cough, Atypical pneumonia       FLUoxetine 40 MG capsule    PROzac    90 capsule    Take 1 capsule (40 mg)  by mouth daily    Dysthymia       losartan 50 MG tablet    COZAAR    90 tablet    Take 1 tablet (50 mg) by mouth daily    Essential hypertension       methylphenidate 10 MG tablet    RITALIN    62 tablet    Take 1 tablet (10 mg) by mouth 2 times daily    Major depressive disorder, recurrent, severe without psychotic features (H)       metoprolol 25 MG 24 hr tablet    TOPROL-XL    90 tablet    Take 1 tablet (25 mg) by mouth daily    Essential hypertension       nitroGLYcerin 0.4 MG sublingual tablet    NITROSTAT    25 tablet    Place 1 tablet (0.4 mg) under the tongue every 5 minutes as needed for chest pain    Angina at rest (H)       tamsulosin 0.4 MG capsule    FLOMAX    90 capsule    Take 1 tab by mouth daily. (pharmacy - please fill 90 day supply)    BPH (benign prostatic hypertrophy) with urinary retention       traZODone 50 MG tablet    DESYREL    45 tablet    Take 0.5 tablets (25 mg) by mouth At Bedtime    Insomnia, unspecified type

## 2017-09-06 NOTE — PROGRESS NOTES
Referral for (Test): Psychiatry   Location/Place/Provider: [facility] [address] [place], [place] [address] [address] [address]  Date/Time:  09/15/17 at 12:00pm   Phone: [phone]  Fax:   Additional information/prep.: I printed out the facility address, wrote on the appointment time and date, and mailed it to the pt.  Scheduled by: SCOTT Jain

## 2017-09-07 NOTE — PROGRESS NOTES
Felix,  Your PSA is 0.4 and decreased from 3.0 3 months ago.  We can recheck in 3 or 6 months based on the urologists recommendation.  NHUNG

## 2017-09-12 ENCOUNTER — TELEPHONE (OUTPATIENT)
Dept: FAMILY MEDICINE | Facility: CLINIC | Age: 82
End: 2017-09-12
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
## 2017-09-18 ENCOUNTER — DOCUMENTATION ONLY (OUTPATIENT)
Dept: FAMILY MEDICINE | Facility: CLINIC | Age: 82
End: 2017-09-18

## 2017-09-18 NOTE — PROGRESS NOTES
From:  [facility] [address] [place] [address]  P: [phone]  F: [phone]      To: [facility]    Attn: Dr. Hess    Date: 9/18/2017    RE: Felix Bangura 11/20/1933 MRN [id number]      RESULTS FOR YOUR REVIEW:    Orders Only on 09/06/2017   Component Date Value Ref Range Status     PSA 09/06/2017 0.4  0.0 - 6.5 ng/mL Final       COMMENTS:         Thanks,  Torres Mckeon    Results faxed by Antonietta Massey

## 2017-09-19 ENCOUNTER — TELEPHONE (OUTPATIENT)
Dept: FAMILY MEDICINE | Facility: CLINIC | Age: 82
End: 2017-09-19

## 2017-09-19 NOTE — TELEPHONE ENCOUNTER
I got a call from the pt today, pt called to get his appointment time and date for his psychiatry appointment.  The pt stated that he lost his appointment information.  I gave the pt is appointment at [facility] this Thursday the 21st at 1:45pm.

## 2017-11-03 DIAGNOSIS — I10 ESSENTIAL HYPERTENSION WITH GOAL BLOOD PRESSURE LESS THAN 140/90: ICD-10-CM

## 2017-11-03 RX ORDER — AMLODIPINE BESYLATE 5 MG/1
5 TABLET ORAL DAILY
Qty: 90 TABLET | Refills: 3 | Status: SHIPPED | OUTPATIENT
Start: 2017-11-03 | End: 2018-10-29

## 2017-11-21 DIAGNOSIS — I10 ESSENTIAL HYPERTENSION: ICD-10-CM

## 2017-11-21 RX ORDER — METOPROLOL SUCCINATE 25 MG/1
25 TABLET, EXTENDED RELEASE ORAL DAILY
Qty: 90 TABLET | Refills: 3 | Status: SHIPPED | OUTPATIENT
Start: 2017-11-21 | End: 2018-11-28

## 2017-11-22 DIAGNOSIS — N40.1 BENIGN PROSTATIC HYPERPLASIA WITH LOWER URINARY TRACT SYMPTOMS, SYMPTOM DETAILS UNSPECIFIED: Primary | ICD-10-CM

## 2017-11-22 RX ORDER — TAMSULOSIN HYDROCHLORIDE 0.4 MG/1
CAPSULE ORAL
Qty: 90 CAPSULE | Refills: 3 | Status: SHIPPED | OUTPATIENT
Start: 2017-11-22 | End: 2021-01-01

## 2017-11-30 DIAGNOSIS — C61 MALIGNANT NEOPLASM OF PROSTATE (H): Primary | ICD-10-CM

## 2017-12-27 ENCOUNTER — TELEPHONE (OUTPATIENT)
Dept: FAMILY MEDICINE | Facility: CLINIC | Age: 82
End: 2017-12-27

## 2017-12-27 DIAGNOSIS — R62.7 FAILURE TO THRIVE IN ADULT: Primary | ICD-10-CM

## 2017-12-27 NOTE — TELEPHONE ENCOUNTER
[facility] phone call message- medication clarification/question:    Full Medication Name: Examination gloves     Question: Calling in requesting examination gloves but [facility] told him his prescription needs to be renewed by Dr. Mckeon. Please call and advise.      Pharmacy confirmed as    [facility]     OK to leave a message on voice mail? Yes    Primary language: English      needed? No    Call taken on December 27, 2017 at 12:01 PM by Jenna Rao

## 2017-12-29 DIAGNOSIS — F34.1 DYSTHYMIA: ICD-10-CM

## 2017-12-29 RX ORDER — FLUOXETINE 40 MG/1
40 CAPSULE ORAL DAILY
Qty: 90 CAPSULE | Refills: 3 | Status: SHIPPED | OUTPATIENT
Start: 2017-12-29 | End: 2019-02-28

## 2018-01-15 ENCOUNTER — TELEPHONE (OUTPATIENT)
Dept: FAMILY MEDICINE | Facility: CLINIC | Age: 83
End: 2018-01-15

## 2018-01-15 DIAGNOSIS — R15.9 INCONTINENCE OF FECES, UNSPECIFIED FECAL INCONTINENCE TYPE: Primary | ICD-10-CM

## 2018-01-24 DIAGNOSIS — C61 MALIGNANT NEOPLASM OF PROSTATE (H): Primary | ICD-10-CM

## 2018-01-24 LAB — PSA SERPL-MCNC: 0.4 NG/ML (ref 0–6.5)

## 2018-01-25 ENCOUNTER — DOCUMENTATION ONLY (OUTPATIENT)
Dept: FAMILY MEDICINE | Facility: CLINIC | Age: 83
End: 2018-01-25

## 2018-01-25 NOTE — PROGRESS NOTES
From:  [facility] [address] [place] [address]  P: [phone]  F: [phone]      To: [facility]     Attn: Dr. Hess    Date: 1/25/2018    RE: Felix Bangura 11/20/1933 MRN [id number]      RESULTS FOR YOUR REVIEW:    Orders Only on 01/24/2018   Component Date Value Ref Range Status     PSA 01/24/2018 0.4  0.0 - 6.5 ng/mL Final       COMMENTS:         Thanks,  Torres Mckeon    Results faxed by Antonietta Massey

## 2018-02-07 ENCOUNTER — MEDICAL CORRESPONDENCE (OUTPATIENT)
Dept: HEALTH INFORMATION MANAGEMENT | Facility: CLINIC | Age: 83
End: 2018-02-07

## 2018-02-08 ENCOUNTER — TELEPHONE (OUTPATIENT)
Dept: FAMILY MEDICINE | Facility: CLINIC | Age: 83
End: 2018-02-08

## 2018-02-08 NOTE — TELEPHONE ENCOUNTER
ED follow up    Spoke with pt who reports they ruled out everything and he is ok, he did have a UTI it sounds like and was treated and improving  Asked if he would like to come in and see Dr Mckeon and he refused at this time as he feels fine, but will call if needed - reminded him to call day or night if he has questions or concerns to our 24 hr line    lbetz

## 2018-02-20 ENCOUNTER — DOCUMENTATION ONLY (OUTPATIENT)
Dept: FAMILY MEDICINE | Facility: CLINIC | Age: 83
End: 2018-02-20

## 2018-02-24 NOTE — PROGRESS NOTES
6 month review today-no changes with the POC. Reviewed STARS measures and updated Falls Risk Assessment.     Lyndsey Winkler, [place]  [phone]

## 2018-04-11 ENCOUNTER — MEDICAL CORRESPONDENCE (OUTPATIENT)
Dept: HEALTH INFORMATION MANAGEMENT | Facility: CLINIC | Age: 83
End: 2018-04-11

## 2018-04-24 DIAGNOSIS — I20.89 ANGINA AT REST (H): ICD-10-CM

## 2018-04-24 RX ORDER — NITROGLYCERIN 0.4 MG/1
0.4 TABLET SUBLINGUAL EVERY 5 MIN PRN
Qty: 25 TABLET | Refills: 11 | Status: SHIPPED | OUTPATIENT
Start: 2018-04-24 | End: 2018-04-25

## 2018-04-25 DIAGNOSIS — I20.89 ANGINA AT REST (H): ICD-10-CM

## 2018-04-25 RX ORDER — NITROGLYCERIN 0.4 MG/1
0.4 TABLET SUBLINGUAL EVERY 5 MIN PRN
Qty: 25 TABLET | Refills: 11 | Status: SHIPPED | OUTPATIENT
Start: 2018-04-25

## 2018-05-24 DIAGNOSIS — I10 ESSENTIAL HYPERTENSION: ICD-10-CM

## 2018-05-24 RX ORDER — LOSARTAN POTASSIUM 50 MG/1
50 TABLET ORAL DAILY
Qty: 90 TABLET | Refills: 3 | Status: SHIPPED | OUTPATIENT
Start: 2018-05-24 | End: 2019-06-10

## 2018-06-14 DIAGNOSIS — G47.00 INSOMNIA, UNSPECIFIED TYPE: ICD-10-CM

## 2018-06-14 RX ORDER — TRAZODONE HYDROCHLORIDE 50 MG/1
25 TABLET, FILM COATED ORAL AT BEDTIME
Qty: 45 TABLET | Refills: 3 | Status: SHIPPED | OUTPATIENT
Start: 2018-06-14 | End: 2019-06-20

## 2018-07-02 ENCOUNTER — OFFICE VISIT (OUTPATIENT)
Dept: FAMILY MEDICINE | Facility: CLINIC | Age: 83
End: 2018-07-02
Payer: COMMERCIAL

## 2018-07-02 VITALS
OXYGEN SATURATION: 93 % | TEMPERATURE: 97.6 F | HEART RATE: 59 BPM | SYSTOLIC BLOOD PRESSURE: 112 MMHG | RESPIRATION RATE: 17 BRPM | WEIGHT: 176 LBS | HEIGHT: 68 IN | DIASTOLIC BLOOD PRESSURE: 69 MMHG | BODY MASS INDEX: 26.67 KG/M2

## 2018-07-02 DIAGNOSIS — H57.12 EYE PAIN, LEFT: Primary | ICD-10-CM

## 2018-07-02 RX ORDER — POLYMYXIN B SULFATE AND TRIMETHOPRIM 1; 10000 MG/ML; [USP'U]/ML
1 SOLUTION OPHTHALMIC EVERY 4 HOURS
Qty: 3 ML | Refills: 0 | Status: SHIPPED | OUTPATIENT
Start: 2018-07-02 | End: 2018-07-09

## 2018-07-02 NOTE — MR AVS SNAPSHOT
"              After Visit Summary   7/2/2018    Felix Bangura    MRN: [id number]           Patient Information     Date Of Birth          11/20/1933        Visit Information        Provider Department      7/2/2018 9:40 AM Wilfred Moran MD [facility]        Today's Diagnoses     Eye pain, left    -  1       Follow-ups after your visit        Who to contact     Please call your clinic at [phone] to:    Ask questions about your health    Make or cancel appointments    Discuss your medicines    Learn about your test results    Speak to your doctor            Additional Information About Your Visit        MyChart Information     Zadby gives you secure access to your electronic health record. If you see a primary care provider, you can also send messages to your care team and make appointments. If you have questions, please call your primary care clinic.  If you do not have a primary care provider, please call [phone] and they will assist you.      Zadby is an electronic gateway that provides easy, online access to your medical records. With Zadby, you can request a clinic appointment, read your test results, renew a prescription or communicate with your care team.     To access your existing account, please contact your [facility] or call [phone] for assistance.        Care EveryWhere ID     This is your Care EveryWhere ID. This could be used by other organizations to access your [place] medical records  [id number]        Your Vitals Were     Pulse Temperature Respirations Height Pulse Oximetry BMI (Body Mass Index)    59 97.6  F (36.4  C) (Oral) 17 5' 8\" (172.7 cm) 93% 26.76 kg/m2       Blood Pressure from Last 3 Encounters:   07/02/18 112/69   07/31/17 103/66   07/06/17 96/64    Weight from Last 3 Encounters:   07/02/18 176 lb (79.8 kg)   07/31/17 181 lb (82.1 kg)   07/06/17 182 lb (82.6 kg)              Today, you had the following     No orders " found for display         Today's Medication Changes          These changes are accurate as of 7/2/18 10:58 AM.  If you have any questions, ask your nurse or doctor.               Start taking these medicines.        Dose/Directions    trimethoprim-polymyxin b ophthalmic solution   Commonly known as:  POLYTRIM   Used for:  Eye pain, left   Started by:  Wilfred Moran MD        Dose:  1 drop   Place 1 drop Into the left eye every 4 hours for 7 days   Quantity:  3 mL   Refills:  0            Where to get your medicines      These medications were sent to [id number] - [place], [place] - [address] AT [address], [place] [address]     Phone:  [phone]     trimethoprim-polymyxin b ophthalmic solution                Primary Care Provider Office Phone # Fax #    Torres Mckeon [phone] [address] [facility] [address]        Equal Access to Services     [facility]AT[facility] : Hadii diana pickard hadasho Soomaali, waaxda luqadaha, qaybta kaalmada adeegyada, noah preciado . So [facility] [phone].    ATENCIÓN: Si habla español, tiene a husain disposición servicios gratuitos de asistencia lingüística. [facility] [phone].    We comply with applicable federal civil rights laws and [place] laws. We do not discriminate on the basis of race, color, national origin, age, disability, sex, sexual orientation, or gender identity.            Thank you!     Thank you for choosing [facility]  for your care. Our goal is always to provide you with excellent care. Hearing back from our patients is one way we can continue to improve our services. Please take a few minutes to complete the written survey that you may receive in the mail after your visit with us. Thank you!             Your Updated Medication List - Protect others around you: Learn how to safely use, store and throw away your medicines  at www.disposemymeds.org.          This list is accurate as of 7/2/18 10:58 AM.  Always use your most recent med list.                   Brand Name Dispense Instructions for use Diagnosis    amLODIPine 5 MG tablet    NORVASC    90 tablet    Take 1 tablet (5 mg) by mouth daily    Essential hypertension with goal blood pressure less than 140/90       azithromycin 250 MG tablet    ZITHROMAX    6 tablet    Two tablets first day, then one tablet daily for four days.    Cough, Atypical pneumonia       FLUoxetine 40 MG capsule    PROzac    90 capsule    Take 1 capsule (40 mg) by mouth daily    Dysthymia       losartan 50 MG tablet    COZAAR    90 tablet    Take 1 tablet (50 mg) by mouth daily    Essential hypertension       methylphenidate 10 MG tablet    RITALIN    62 tablet    Take 1 tablet (10 mg) by mouth 2 times daily    Major depressive disorder, recurrent, severe without psychotic features (H)       metoprolol succinate 25 MG 24 hr tablet    TOPROL-XL    90 tablet    Take 1 tablet (25 mg) by mouth daily    Essential hypertension       nitroGLYcerin 0.4 MG sublingual tablet    NITROSTAT    25 tablet    Place 1 tablet (0.4 mg) under the tongue every 5 minutes as needed for chest pain    Angina at rest (H)       order for DME     400 Device    Equipment being ordered: gloves for cares 6 times per day (12 gloves) Dispense 400 per month Refill 11    Failure to thrive in adult       Synthetic Vinyl Exam Gloves Misc     100 each    1 Package 5 times daily    Incontinence of feces, unspecified fecal incontinence type       tamsulosin 0.4 MG capsule    FLOMAX    90 capsule    Take 1 tab by mouth daily. (pharmacy - please fill 90 day supply)    Benign prostatic hyperplasia with lower urinary tract symptoms, symptom details unspecified       traZODone 50 MG tablet    DESYREL    45 tablet    Take 0.5 tablets (25 mg) by mouth At Bedtime    Insomnia, unspecified type       trimethoprim-polymyxin b ophthalmic solution    POLYTRIM     3 mL    Place 1 drop Into the left eye every 4 hours for 7 days    Eye pain, left

## 2018-07-02 NOTE — PROGRESS NOTES
Preceptor Attestation:  Patient's case reviewed and discussed with  Patient seen and discussed with the resident..  I agree with written assessment and plan of care.  Supervising Physician:  Pia Donald MD  [facility]

## 2018-07-02 NOTE — PROGRESS NOTES
"Assessment and Plan     (H57.12) Eye pain, left  (primary encounter diagnosis)  Comment: Unclear etiology, eye does not appear to be infected - not erythematous, no obvious abnormality on exam. Does not seem consistent with a cellulitis. Unclear what \"ointment\" patient is putting on at home that improved symptoms. Possibly a corneal abrasion while sleeping versus trauma from unknown source.     Plan: trimethoprim-polymyxin b (POLYTRIM) ophthalmic         Solution  If pain continues after 1 week may need to place opthalmology referral for further evaluation.           Options for treatment and follow-up care were reviewed with the patient and/or guardian. Felix Bangura and/or guardian engaged in the decision making process and verbalized understanding of the options discussed and agreed with the final plan.    Wilfred Moran MD   [facility]  Pager #: [phone]    Precepted today with: Dr. Donald           HPI:       Felix Bangura is a 84 year old male who presents with left eye pain.    Symptoms started yesterday morning when he work up. When he went to bed he may have had some tenderness, but was worse in the AM. He does not recall any events overnight. He notes poor hand hygiene and tries to avoid eye contact from his hands, but notes while sleeping he may rub them. Does not have pain with eye movement. His wife had a tube of antibiotic ointment that may have helped a little, but now feels like it is getting worse. No changes to his vision. Has not had any fevers. No swelling or redness to tender area. He does not have any pain unless he touches the area.          PMHX:     Patient Active Problem List   Diagnosis     Health Care Home     Abdominal hernia Incisional Epigastric     Major depressive disorder, recurrent episode (H)     Complete rupture of rotator cuff     Diastolic dysfunction     Dysthymia     Hypercholesterolemia     Rotator cuff (capsule) sprain     Loss of " Pleasure From Usual Activities ( Anhedonia)     Low back pain     Nevus, non-neoplastic     Obstructive sleep apnea     Osteoarthrosis     Seborrheic keratosis     Insomnia due to medical condition     Adjustment disorder with anxious mood     Aortic valve stenosis     Failure to thrive in adult     Malignant neoplasm of prostate (H)       Current Outpatient Prescriptions   Medication Sig Dispense Refill     amLODIPine (NORVASC) 5 MG tablet Take 1 tablet (5 mg) by mouth daily 90 tablet 3     azithromycin (ZITHROMAX) 250 MG tablet Two tablets first day, then one tablet daily for four days. 6 tablet 0     Disposable Gloves (SYNTHETIC VINYL EXAM GLOVES) MISC 1 Package 5 times daily 100 each 11     FLUoxetine (PROZAC) 40 MG capsule Take 1 capsule (40 mg) by mouth daily 90 capsule 3     losartan (COZAAR) 50 MG tablet Take 1 tablet (50 mg) by mouth daily 90 tablet 3     methylphenidate (RITALIN) 10 MG tablet Take 1 tablet (10 mg) by mouth 2 times daily 62 tablet 0     metoprolol (TOPROL-XL) 25 MG 24 hr tablet Take 1 tablet (25 mg) by mouth daily 90 tablet 3     nitroGLYcerin (NITROSTAT) 0.4 MG sublingual tablet Place 1 tablet (0.4 mg) under the tongue every 5 minutes as needed for chest pain 25 tablet 11     order for DME Equipment being ordered: gloves for cares  6 times per day (12 gloves)  Dispense 400 per month  Refill 11 400 Device 11     tamsulosin (FLOMAX) 0.4 MG capsule Take 1 tab by mouth daily. (pharmacy - please fill 90 day supply) 90 capsule 3     traZODone (DESYREL) 50 MG tablet Take 0.5 tablets (25 mg) by mouth At Bedtime 45 tablet 3       Social History     Social History     Marital status:      Spouse name: N/A     Number of children: N/A     Years of education: N/A     Occupational History     Not on file.     Social History Main Topics     Smoking status: Former Smoker     Smokeless tobacco: Never Used      Comment: 1978 quit     Alcohol use Yes     Drug use: No     Sexual activity: Not on file  "    Other Topics Concern     Not on file     Social History Narrative          Allergies   Allergen Reactions     Aspirin      Legs feel funny, pain in knee     Clam Shell      Dust Mites        No results found for this or any previous visit (from the past 24 hour(s)).         Review of Systems:   C: NEGATIVE for fatigue, unexpected change in weight  E: NEGATIVE for acute vision problems or changes  R: NEGATIVE for significant cough or shortness of breath  CV: NEGATIVE for chest pain, palpitations or new or worsening peripheral edema  P: NEGATIVE for changes in mood or affect          Physical Exam:     Vitals:    07/02/18 0954   BP: 112/69   BP Location: Right arm   Patient Position: Chair   Cuff Size: Adult Regular   Pulse: 59   Resp: 17   Temp: 97.6  F (36.4  C)   TempSrc: Oral   SpO2: 93%   Weight: 176 lb (79.8 kg)   Height: 5' 8\" (172.7 cm)     Body mass index is 26.76 kg/(m^2).    GENERAL APPEARANCE: alert and no acute distress, sitting in wheel chair  PSYCH: Odd affect but participates in exam  EYE: EOMI, no tenderness with movement, very tender about 3cm lateral from left eye, trace scleral injection bilaterally, no obvious corneal deficit.   EXT: no cyanosis or edema in lower extremities  SKIN: no venous stasis changes      "

## 2018-07-19 ENCOUNTER — TELEPHONE (OUTPATIENT)
Dept: FAMILY MEDICINE | Facility: CLINIC | Age: 83
End: 2018-07-19

## 2018-07-19 DIAGNOSIS — F33.2 MAJOR DEPRESSIVE DISORDER, RECURRENT, SEVERE WITHOUT PSYCHOTIC FEATURES (H): ICD-10-CM

## 2018-07-19 RX ORDER — METHYLPHENIDATE HYDROCHLORIDE 10 MG/1
10 TABLET ORAL 2 TIMES DAILY
Qty: 62 TABLET | Refills: 0 | Status: SHIPPED | OUTPATIENT
Start: 2018-07-19 | End: 2018-09-05

## 2018-07-19 NOTE — TELEPHONE ENCOUNTER
Spoke with Felix who is requesting Concerta 54 mg ER taking one tablet daily. I spoke with Dr Mckeon who is ok with prescribing Methylphenidate (Ritalin) 10mg only. Not Concerta 54 mg. Felix informed.  He states Methylphenidate 10mg has not been effective in the past and will not do anything for him.  Will file and place printed rx for Methylphenidate 10mg # 62 in controlled substance accordion file in case Felix would like to  rx.  Reno ADDISON    Rx has been filed on July 19, 2018 4:19 PM  By: Shana Khanna  Patient has been notified Rx ready for .

## 2018-07-19 NOTE — TELEPHONE ENCOUNTER
[facility] phone call message- medication clarification/question:    Full Medication Name: Methylphenidate   Strength: 54 mg ER    Have you contacted your pharmacy about this refill request?     If  Yes,  which pharmacy?    When did you contact the pharmacy?    Additional comments/concerns from call to pharmacy:    Reason for call to clinic: Patient is calling to see if Dr. Mckeon is willing to take over refilling Rx above for Patient that he gets from his psychiatrist? He states he has problems with anxiety leaving his house. Told him it could take up to two business days for a response. Please call and advise.       Pharmacy confirmed as    [id number] - [place], [place] - [address] AT [facility]: Yes    OK to leave a message on voice mail? Yes    Primary language: English      needed? No    Call taken on July 19, 2018 at 1:33 PM by Doug Carreon

## 2018-08-20 ENCOUNTER — DOCUMENTATION ONLY (OUTPATIENT)
Dept: FAMILY MEDICINE | Facility: CLINIC | Age: 83
End: 2018-08-20

## 2018-08-23 NOTE — PROGRESS NOTES
"Visit to the client's home for annual health risk assessment.  An  was not needed.    Current situation/living environment  Felix is an 84 year old male who lives in a single family home with his spouse and his adult son. He has 3 children total, and he wife runs a day care of out of their home, but currently does not have any children enrolled. In the last 6 months, he reports one fall in the bathroom (more of a slip, per his report, no injury) and has been to the ER once, but no admission to the hospital.    Activities of daily living (ADL)/instrumental activities of daily living (IADL) and functional issues  Client needs help with the following ADL's: dressing, grooming, bathing, bed mobility, transfers, walking and toileting (lots of cues, prompts, and encouragement--a lot of it is related to what he calls \"Lack of Will\").  Client needs help with the following IADL's: shopping, cooking, housekeeping, laundry, managing finances/bills and transportation  Client states he is unable to perform the above due to Osteoarthritis, general weakness, \"Lack of Will\", low back pain, Major Depressive disorder.      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 He states that he has a fear of going out, and he has a fear that something \"bad will happen\", but cannot describe what it might be. Has stopped going to his psychiatrist and plans to stop taking the medication that has been prescribed by this doctor. We tried to find tele-psychiatry, but have had no luck. He did agree to a therapist from [facility] to come to the home to help him work through this.    Has patient fallen 2 or more times in the last year? No  Has patient fallen with injury in the last year? No    Cognition/mental health  See above-no new cognition issues, just increased concerns about \"Lack of Will\" and what he is calling \"agoraphobia\" (not formally " diagnosed). He is on adderall, which he is tapering off of. He did recently go to the dentist and didn't have many concerns/fears about that visit.    STARS/Med Adherence  Client is non-compliant with the following quality measures: NA  Comments: Encouraged Felix to get a Medicare Wellness Exam at the clinic. Shared the Health Promotion that [facility] offers to [facility].    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 His spouse, Luz, and son Isrrael are employed by him to provide care. Also gets DME through [employer], such as incontinence products and gloves    Lyndsey Winkler, LSW  [phone]

## 2018-08-31 ENCOUNTER — TELEPHONE (OUTPATIENT)
Dept: FAMILY MEDICINE | Facility: CLINIC | Age: 83
End: 2018-08-31

## 2018-08-31 NOTE — TELEPHONE ENCOUNTER
I called patient to get more information regarding the autoimmune disease. Patient couldn't remember who diagnosed him or where he was diagnosed at but he said it was 3 years ago. I read some of his diagnosis to him to see if any sounds familiar but he said no. I told him he's more than welcome to come in if he wants more clarification on any of his diagnosis. Pt thank me for the effort.

## 2018-08-31 NOTE — TELEPHONE ENCOUNTER
[facility] phone call message- general phone call:    Reason for call: Pt calling in requesting a call back. States he was diagnosed with an autoimmune disease and believes was receiving some sort of globulin treatment for it. Would like to know the name of the disease. Please call and advise.    Return call needed: Yes    OK to leave a message on voice mail? Yes    Primary language: English      needed? No    Call taken on August 31, 2018 at 1:25 PM by Jenna Rao

## 2018-09-04 ENCOUNTER — TELEPHONE (OUTPATIENT)
Dept: FAMILY MEDICINE | Facility: CLINIC | Age: 83
End: 2018-09-04

## 2018-09-04 DIAGNOSIS — F33.2 MAJOR DEPRESSIVE DISORDER, RECURRENT, SEVERE WITHOUT PSYCHOTIC FEATURES (H): ICD-10-CM

## 2018-09-04 NOTE — TELEPHONE ENCOUNTER
[facility] phone call message- medication clarification/question:    Full Medication Name:    Strength:     Have you contacted your pharmacy about this refill request?     If  Yes,  which pharmacy?    When did you contact the pharmacy?    Additional comments/concerns from call to pharmacy:    Reason for call to clinic: Patient is calling to see if Dr. Mckeon could send in some vitamins to the pharmacy for him, he states he has been paying for them but is not able to pay out of pocket anymore so going to try to see if he can run it through insurance to see if they would cover them so he don't have to pay for them anymore. He would like Vitamin A beta Carotene, vitamin E 400, choline vitamin, he states any for these 3 vitamins he wants all but any is fine as long as it's the 3 vitamins. He also request to have some vinyl gloves size large sent in for him as well. Please call and advise.       Pharmacy confirmed as    [id number] - [place], [place] - [address] AT [facility]: Yes    OK to leave a message on voice mail?     Primary language: English      needed? No    Call taken on September 4, 2018 at 1:20 PM by Doug Carreon

## 2018-09-05 ENCOUNTER — DOCUMENTATION ONLY (OUTPATIENT)
Dept: FAMILY MEDICINE | Facility: CLINIC | Age: 83
End: 2018-09-05

## 2018-09-05 RX ORDER — METHYLPHENIDATE HYDROCHLORIDE 10 MG/1
10 TABLET ORAL 2 TIMES DAILY
Qty: 62 TABLET | Refills: 0 | Status: SHIPPED | OUTPATIENT
Start: 2018-09-05 | End: 2018-10-16

## 2018-09-05 NOTE — PROGRESS NOTES
Rx has been filed on September 5, 2018 2:39 PM  By: Lilia Angela  Patient has been notified Rx ready for .   Porfirio ADDISON

## 2018-09-14 NOTE — PROGRESS NOTES
Rx picked up on September 10, 2018 8:55 AM by Isrrael Bangura ([facility])  Rx given by Macarena Dill  Photo ID verified and signature obtained?: Yes

## 2018-09-28 ENCOUNTER — MEDICAL CORRESPONDENCE (OUTPATIENT)
Dept: HEALTH INFORMATION MANAGEMENT | Facility: CLINIC | Age: 83
End: 2018-09-28

## 2018-10-01 ENCOUNTER — TELEPHONE (OUTPATIENT)
Dept: FAMILY MEDICINE | Facility: CLINIC | Age: 83
End: 2018-10-01

## 2018-10-01 DIAGNOSIS — R62.7 FAILURE TO THRIVE IN ADULT: Primary | ICD-10-CM

## 2018-10-01 NOTE — TELEPHONE ENCOUNTER
[facility] phone call message- medication clarification/question:    Full Medication Name: Vitamin D   Dose:     Question: Patient is requesting this medication and would rather his PCP prescribe so that his insurance would cover it rather than him paying out of pocket.     Pharmacy confirmed as    [id number] - [place], [place] - [address] AT [id number] - [place] - [address] [id number] - [place] - [address] [facility] AT [facility]: Yes    OK to leave a message on voice mail? Yes    Primary language: English      needed? No    Call taken on October 1, 2018 at 3:23 PM by Macarena Dill

## 2018-10-02 RX ORDER — CHOLECALCIFEROL (VITAMIN D3) 50 MCG
2000 TABLET ORAL DAILY
Qty: 100 TABLET | Refills: 3 | Status: SHIPPED | OUTPATIENT
Start: 2018-10-02 | End: 2019-09-04

## 2018-10-15 ENCOUNTER — TELEPHONE (OUTPATIENT)
Dept: FAMILY MEDICINE | Facility: CLINIC | Age: 83
End: 2018-10-15

## 2018-10-15 DIAGNOSIS — C61 MALIGNANT NEOPLASM OF PROSTATE (H): Primary | ICD-10-CM

## 2018-10-15 NOTE — TELEPHONE ENCOUNTER
Patient will call back when in March to schedule a lab visit when he determines his schedule and ride.

## 2018-10-15 NOTE — TELEPHONE ENCOUNTER
----- Message from Torres Mckeon MD sent at 10/15/2018  2:16 PM CDT -----  Please call, PSA scheduled for March 25, 2019, one week prior to urology visit with Dr Hess.  WR

## 2018-10-16 DIAGNOSIS — F33.2 MAJOR DEPRESSIVE DISORDER, RECURRENT, SEVERE WITHOUT PSYCHOTIC FEATURES (H): ICD-10-CM

## 2018-10-16 NOTE — TELEPHONE ENCOUNTER
Message to physician:  Date of last visit: 7/2/2018     Date of next visit if scheduled: Visit date not found      Last Comprehensive Metabolic Panel:  Sodium   Date Value Ref Range Status   05/16/2017 135 (L) 136 - 145 mmol/L Final     Potassium   Date Value Ref Range Status   05/16/2017 4.3 3.5 - 5.0 mmol/L Final     Chloride   Date Value Ref Range Status   05/16/2017 102 98 - 107 mmol/L Final     Carbon Dioxide   Date Value Ref Range Status   05/07/2015 28.0 20.0 - 32.0 mmol/L Final     Anion Gap   Date Value Ref Range Status   03/31/2015 7 3 - 14 mmol/L Final     Glucose   Date Value Ref Range Status   05/16/2017 132 (H) 70 - 125 mg/dL Final     Urea Nitrogen   Date Value Ref Range Status   05/16/2017 22 8 - 28 mg/dL Final     Creatinine   Date Value Ref Range Status   05/16/2017 1.13 0.70 - 1.30 mg/dL Final     GFR Estimate   Date Value Ref Range Status   05/16/2017 >60 >60 mL/min/1.73m2 Final     Calcium   Date Value Ref Range Status   05/16/2017 9.2 8.5 - 10.5 mg/dL Final       BP Readings from Last 3 Encounters:   07/02/18 112/69   07/31/17 103/66   07/06/17 96/64       No results found for: A1C             Please complete refill and CLOSE ENCOUNTER.  Closing the encounter signifies the refill is complete.

## 2018-10-17 ENCOUNTER — TELEPHONE (OUTPATIENT)
Dept: FAMILY MEDICINE | Facility: CLINIC | Age: 83
End: 2018-10-17

## 2018-10-17 RX ORDER — METHYLPHENIDATE HYDROCHLORIDE 10 MG/1
10 TABLET ORAL 2 TIMES DAILY
Qty: 62 TABLET | Refills: 0 | Status: SHIPPED | OUTPATIENT
Start: 2018-10-17 | End: 2018-11-16

## 2018-10-17 NOTE — TELEPHONE ENCOUNTER
Paula the referral specialist had talked to me about the pt.  She stated that the pt had called her wanting to talk to Padma, our old referral specialist.  Paula did not know how to help the pt and she wanted me to help call the pt and see what he wanted.  I called the pt to see if I can help him.  He stated that he wants to know who he should call about his house.  Pt stated that he has been living there, and he has had pneumonia twice.  Pt stated that he feels that there is something in the house that is causing him to be sick.  He wanted to know if someone can come out and inspect his house.  I told the pt that I will call the county and see if I can find out anything to help him.

## 2018-10-17 NOTE — TELEPHONE ENCOUNTER
Rx has been filed on October 17, 2018 1:59 PM  By: Shana Khanna  Patient has been notified Rx ready for .   Reno ADDISON

## 2018-10-17 NOTE — TELEPHONE ENCOUNTER
I called the [facility], and the city about the pt concern.  They informed me that there is not a department that would be able to help with the pt.  The pt does not know what is causing him to have health issue, and he does live in his own house.  They stated that if the pt lives in an apartment, or a building, they can have the  to come out and check.  The pt lives in his own house, he will have to have a independent  come out and do an assessment.

## 2018-10-17 NOTE — TELEPHONE ENCOUNTER
I called the pt and informed him what I have found out.  I gave him [employer] phone number to call for a visual inspection.  I informed him that after the visual inspection, they will either refer him to another person or what is the next step.  He can either go with the recommendation or now.

## 2018-10-17 NOTE — TELEPHONE ENCOUNTER
I called [employer] to find out about an assessment of the pt house, and how much will it cost.  They informed me that they do not charge for a visual inspection of the house.  If they can not find anything, they will refer the pt to a hygienic, which will come out and test the air in the pt home.  The hygienic will determine after testing the air, what is the cause if any.  They gave me a hygienic name (Amrit) that they deal with, and he works for [employer].  He can be reached at [phone].

## 2018-10-29 DIAGNOSIS — I10 ESSENTIAL HYPERTENSION WITH GOAL BLOOD PRESSURE LESS THAN 140/90: ICD-10-CM

## 2018-10-29 RX ORDER — AMLODIPINE BESYLATE 5 MG/1
5 TABLET ORAL DAILY
Qty: 92 TABLET | Refills: 3 | Status: SHIPPED | OUTPATIENT
Start: 2018-10-29 | End: 2019-09-04

## 2018-11-16 ENCOUNTER — TELEPHONE (OUTPATIENT)
Dept: FAMILY MEDICINE | Facility: CLINIC | Age: 83
End: 2018-11-16

## 2018-11-16 DIAGNOSIS — F33.2 MAJOR DEPRESSIVE DISORDER, RECURRENT, SEVERE WITHOUT PSYCHOTIC FEATURES (H): ICD-10-CM

## 2018-11-16 NOTE — TELEPHONE ENCOUNTER
[facility] phone call message- medication clarification/question:    Full Medication Name: Ritalin      Question: Pt calling in requesting refill on medication above.        OK to leave a message on voice mail? Yes    Primary language: English      needed? No    Call taken on November 16, 2018 at 9:55 AM by Jenna Rao

## 2018-11-19 RX ORDER — METHYLPHENIDATE HYDROCHLORIDE 10 MG/1
10 TABLET ORAL 2 TIMES DAILY
Qty: 62 TABLET | Refills: 0 | Status: SHIPPED | OUTPATIENT
Start: 2018-11-19 | End: 2018-12-10

## 2018-11-19 NOTE — TELEPHONE ENCOUNTER
Rx has been filed on November 19, 2018 2:39 PM  By: Shana Khanna  Patient has been notified Rx ready for  via message left on voicemail. Ren[place] ADDISON

## 2018-11-28 DIAGNOSIS — I10 ESSENTIAL HYPERTENSION: ICD-10-CM

## 2018-11-28 RX ORDER — METOPROLOL SUCCINATE 25 MG/1
25 TABLET, EXTENDED RELEASE ORAL DAILY
Qty: 90 TABLET | Refills: 3 | Status: SHIPPED | OUTPATIENT
Start: 2018-11-28 | End: 2020-01-02

## 2018-11-28 NOTE — TELEPHONE ENCOUNTER
Message to physician: n/a    Date of last visit: 7/2/2018     Date of next visit if scheduled: Visit date not found      Last Comprehensive Metabolic Panel:  Sodium   Date Value Ref Range Status   05/16/2017 135 (L) 136 - 145 mmol/L Final     Potassium   Date Value Ref Range Status   05/16/2017 4.3 3.5 - 5.0 mmol/L Final     Chloride   Date Value Ref Range Status   05/16/2017 102 98 - 107 mmol/L Final     Carbon Dioxide   Date Value Ref Range Status   05/07/2015 28.0 20.0 - 32.0 mmol/L Final     Anion Gap   Date Value Ref Range Status   03/31/2015 7 3 - 14 mmol/L Final     Glucose   Date Value Ref Range Status   05/16/2017 132 (H) 70 - 125 mg/dL Final     Urea Nitrogen   Date Value Ref Range Status   05/16/2017 22 8 - 28 mg/dL Final     Creatinine   Date Value Ref Range Status   05/16/2017 1.13 0.70 - 1.30 mg/dL Final     GFR Estimate   Date Value Ref Range Status   05/16/2017 >60 >60 mL/min/1.73m2 Final     Calcium   Date Value Ref Range Status   05/16/2017 9.2 8.5 - 10.5 mg/dL Final       BP Readings from Last 3 Encounters:   07/02/18 112/69   07/31/17 103/66   07/06/17 96/64       No results found for: A1C             Please complete refill and CLOSE ENCOUNTER.  Closing the encounter signifies the refill is complete.

## 2018-12-10 DIAGNOSIS — F33.2 MAJOR DEPRESSIVE DISORDER, RECURRENT, SEVERE WITHOUT PSYCHOTIC FEATURES (H): ICD-10-CM

## 2018-12-10 RX ORDER — METHYLPHENIDATE HYDROCHLORIDE 10 MG/1
10 TABLET ORAL 2 TIMES DAILY
Qty: 62 TABLET | Refills: 0 | Status: SHIPPED | OUTPATIENT
Start: 2018-12-10 | End: 2019-01-15

## 2018-12-10 NOTE — TELEPHONE ENCOUNTER
Rx has been filed on December 10, 2018 2:40 PM  By: Shana Khanna  Patient has been notified Rx ready for .   Encounter will be routed to  pool for documentation when patient's grandson picks up rx. Reno ADDISON

## 2018-12-14 ENCOUNTER — TELEPHONE (OUTPATIENT)
Dept: FAMILY MEDICINE | Facility: CLINIC | Age: 83
End: 2018-12-14

## 2018-12-14 NOTE — TELEPHONE ENCOUNTER
I got the pt ED discharge paperwork, I called to check up on the pt and help setup a ED follow up.  The pt was at [facility] for chest pain.  I talked to the pt, he stated that he is doing fine, he does not feel that he needs a follow up.  I told him that if he changes his mind to give us a call back.

## 2018-12-27 NOTE — TELEPHONE ENCOUNTER
Rx picked up on December 11, 2018 1:23 PM by Isrrael Bangura-son  Rx given by Macarena Dill  Photo ID verified and signature obtained?: Yes

## 2019-01-15 DIAGNOSIS — F33.2 MAJOR DEPRESSIVE DISORDER, RECURRENT, SEVERE WITHOUT PSYCHOTIC FEATURES (H): ICD-10-CM

## 2019-01-15 RX ORDER — METHYLPHENIDATE HYDROCHLORIDE 10 MG/1
10 TABLET ORAL 2 TIMES DAILY
Qty: 180 TABLET | Refills: 0 | Status: SHIPPED | OUTPATIENT
Start: 2019-01-15 | End: 2019-01-21

## 2019-01-15 NOTE — TELEPHONE ENCOUNTER
Message to physician: THE PATIENT IS REQUESTING TO DISPENSE A 3 MONTHS SUPPLY.    Date of last visit: 7/2/2018     Date of next visit if scheduled: Visit date not found      Last Comprehensive Metabolic Panel:  Sodium   Date Value Ref Range Status   05/16/2017 135 (L) 136 - 145 mmol/L Final     Potassium   Date Value Ref Range Status   05/16/2017 4.3 3.5 - 5.0 mmol/L Final     Chloride   Date Value Ref Range Status   05/16/2017 102 98 - 107 mmol/L Final     Carbon Dioxide   Date Value Ref Range Status   05/07/2015 28.0 20.0 - 32.0 mmol/L Final     Anion Gap   Date Value Ref Range Status   03/31/2015 7 3 - 14 mmol/L Final     Glucose   Date Value Ref Range Status   05/16/2017 132 (H) 70 - 125 mg/dL Final     Urea Nitrogen   Date Value Ref Range Status   05/16/2017 22 8 - 28 mg/dL Final     Creatinine   Date Value Ref Range Status   05/16/2017 1.13 0.70 - 1.30 mg/dL Final     GFR Estimate   Date Value Ref Range Status   05/16/2017 >60 >60 mL/min/1.73m2 Final     Calcium   Date Value Ref Range Status   05/16/2017 9.2 8.5 - 10.5 mg/dL Final       BP Readings from Last 3 Encounters:   07/02/18 112/69   07/31/17 103/66   07/06/17 96/64       No results found for: A1C             Please complete refill and CLOSE ENCOUNTER.  Closing the encounter signifies the refill is complete.

## 2019-01-15 NOTE — TELEPHONE ENCOUNTER
Rx has been filed on January 15, 2019 10:47 AM  By: Shana Khanna  Patient has been notified Rx ready for . Reno ADDISON

## 2019-01-15 NOTE — TELEPHONE ENCOUNTER
[facility] Family Medicine phone call message- patient requesting a refill:    Full Medication Name: methylphenidate (RITALIN)     Dose: 10 MG tablet    Pharmacy confirmed as   [facility] - [place], [place] - [address] [place] [address] & [facility]  [address] [place] [address]  Phone: [phone] Fax: [phone]    [facility] - [place], [place] - [address] [place] [address]  [address] [place] [address]  Phone: [phone] Fax: [phone]    [facility] - [place], [place] - [address] [facility] [address] & [place] [address] [place] [address]  Phone: [phone] Fax: [phone]  : No:     [facility] - [address] [place] [address] AVE: YES    Additional Comments: Patient states he needs a refill for the medication listed above. He states if it's possible to send over a 3 month supply for this medication. Please call and advise.      OK to leave a message on voice mail? Yes    Primary language: English      needed? No    Call taken on January 15, 2019 at 9:58 AM by Luz Pillai

## 2019-01-18 NOTE — TELEPHONE ENCOUNTER
Rx picked up on January 18, 2019 1:57 PM by Isrrael Bangura   Rx given by Doug Carreon  Photo ID verified and signature obtained?: Yes

## 2019-01-21 DIAGNOSIS — F33.2 MAJOR DEPRESSIVE DISORDER, RECURRENT, SEVERE WITHOUT PSYCHOTIC FEATURES (H): ICD-10-CM

## 2019-01-21 NOTE — TELEPHONE ENCOUNTER
[facility] Family Medicine phone call message- patient requesting a refill:    Full Medication Name: Methylphenidate    Dose: 10mg    Pharmacy confirmed as   [email or web address] - [place], [place] - [address] [facility] & [facility]  [address] [place] [address]  Phone: [phone] Fax: [phone]    [facility] - [place], [place] - [address] [facility]  [address] [place] [address]  Phone: [phone] Fax: [phone]    [facility] - [place], [place] - [address] [facility] & [place]  [address] [place] [address]  Phone: [phone] Fax: [phone]  : Yes    Additional Comments:    PATIENT STATES THAT THE PHARMACY ONLY HAD 12 TABLETS TO GIVE THE PATIENT. STATES THAT THE PHARMACY NEEDS A NEW SCRIPT  TABLETS.     OK to leave a message on voice mail? Yes    Primary language: English      needed? No    Call taken on January 21, 2019 at 9:00 AM by Macarena Dill

## 2019-01-22 ENCOUNTER — TELEPHONE (OUTPATIENT)
Dept: FAMILY MEDICINE | Facility: CLINIC | Age: 84
End: 2019-01-22

## 2019-01-22 RX ORDER — METHYLPHENIDATE HYDROCHLORIDE 10 MG/1
10 TABLET ORAL 2 TIMES DAILY
Qty: 62 TABLET | Refills: 0 | Status: SHIPPED | OUTPATIENT
Start: 2019-01-22 | End: 2019-02-25

## 2019-01-22 NOTE — TELEPHONE ENCOUNTER
[facility]  [address] [place] [address]  Phone: [phone]  Fax: [phone]  Order Confirmation  To: Dr. MELGAR Fax Number: [phone]  Patient Name: BRIANNA EDOUARD Date of Surgery: 02-15-18  Order Processed By: Ximena  Processed: 1/10/2018 12:19 PM   Patient returned a missed a call, notified the patient one of the nurses from the clinic called regarding his prescription is ready for . He stated he would be picking up his rx today. No return call was needed.

## 2019-01-22 NOTE — TELEPHONE ENCOUNTER
Rx has been filed on January 22, 2019 2:00 PM  By: Shana Khanna  Patient has been notified Rx ready for  via voice mail. Reno ADDISON

## 2019-01-24 ENCOUNTER — TELEPHONE (OUTPATIENT)
Dept: FAMILY MEDICINE | Facility: CLINIC | Age: 84
End: 2019-01-24

## 2019-01-24 NOTE — TELEPHONE ENCOUNTER
Patient's son Isrrael Bangura came into clinic on 1/24/2019 to pick-up the rx prescription for Ritalin. Copy of ID was taken and he signed the paper that he picked up the rx prescription. Paper with ID photo copy was put in the red folder in medical records.

## 2019-02-13 ENCOUNTER — TELEPHONE (OUTPATIENT)
Dept: FAMILY MEDICINE | Facility: CLINIC | Age: 84
End: 2019-02-13

## 2019-02-13 NOTE — TELEPHONE ENCOUNTER
Called Luz to discuss concerns regarding the patient. Luz endorsed increased deterioration with the patient and wanting to know what the next steps are for care of the patient.  Luz requested to speak with the clinic manager for further concerns. Advised Luz I will document the encounter and route to clinic manager. Luz verbalized understanding and stated she is available all day today for a phone call. Porfirio ADDISON

## 2019-02-13 NOTE — TELEPHONE ENCOUNTER
[facility] Family Medicine phone call message- general phone call:    Reason for call: Would like to speak to someone about Patient's condition and that he is not capable of coming to the clinic and that it is getting worst. She ask to speak to Dr. Mckeon today, told her that he is on Vacation. She is okay for someone else to call her back. Please call and advise.     Return call needed: Yes    OK to leave a message on voice mail?     Primary language: English      needed? No    Call taken on February 13, 2019 at 12:34 PM by Doug Carreon

## 2019-02-21 ENCOUNTER — TELEPHONE (OUTPATIENT)
Dept: FAMILY MEDICINE | Facility: CLINIC | Age: 84
End: 2019-02-21

## 2019-02-21 DIAGNOSIS — R30.0 DYSURIA: ICD-10-CM

## 2019-02-21 DIAGNOSIS — R54 FRAIL ELDERLY: Primary | ICD-10-CM

## 2019-02-21 NOTE — TELEPHONE ENCOUNTER
[facility] Family Medicine phone call message- general phone call:    Reason for call: Patient's wife would like a call back from Dr. Mckeon or any doctor that is available. She states patient can't be transported to clinic, his muscles are very weak and illness is progressing. She states patient presents symptoms of UTI. She also wants to discuss physical therapy and arrangements for home visits for patient.  She would like a call back today if possible due to the condition of the patient.  Please call and advise.      Return call needed: Yes    OK to leave a message on voice mail? Yes    Primary language: English      needed? No    Call taken on February 21, 2019 at 11:26 AM by Suzie Vasquez

## 2019-02-21 NOTE — TELEPHONE ENCOUNTER
Initial recommendation after review of message, would recommend wife call paramedics for assessment and transfer patient to ED for further evaluation. After speaking with pt's spouse Luz, request would be for a physician to go out to home and do home visit assessment.  She does not agree and feel situation is an emergency to present into hospital yet.  Would prefer to drop off urine specimen for testing to rule out UTI. C/o strong urine odor, dark- more brown than yellow, dysuria (described as unpleasant sensation in genital area) with or without urination. Increase in urinary frequency to having incontinence. No fevers. No issues with fluid intake, in fact typically drinks > 8 glasses a day. Physical ability- has noticed decrease and more muscle weakness. Mental ability- still sits at his computer, writing essays. Will route to Dr Mckeon for review and further advise/ recommendations. Thank you.  Reno ADDISON

## 2019-02-21 NOTE — TELEPHONE ENCOUNTER
Spoke with Shoshana.  Failing at home but family able to continue cares.  Would like PT eval and possible home therapy.  Urine is foul smelling  - UA/UC. Will drop off sample.  PH of saliva is 5, not sure what to do with his.

## 2019-02-22 ENCOUNTER — HOME CARE/HOSPICE - HEALTHEAST (OUTPATIENT)
Dept: HOME HEALTH SERVICES | Facility: HOME HEALTH | Age: 84
End: 2019-02-22

## 2019-02-22 DIAGNOSIS — R30.0 DYSURIA: Primary | ICD-10-CM

## 2019-02-22 DIAGNOSIS — R30.0 DYSURIA: ICD-10-CM

## 2019-02-22 LAB
BILIRUBIN UR: NEGATIVE
BLOOD UR: ABNORMAL
GLUCOSE URINE: NEGATIVE
KETONES UR QL: NEGATIVE
LEUKOCYTE ESTERASE UR: ABNORMAL
NITRITE UR QL STRIP: NEGATIVE
PH UR STRIP: 6.5 [PH] (ref 5–7)
PROTEIN UR: NEGATIVE
SP GR UR STRIP: 1.01
UROBILINOGEN UR STRIP-ACNC: ABNORMAL

## 2019-02-22 RX ORDER — SULFAMETHOXAZOLE/TRIMETHOPRIM 800-160 MG
1 TABLET ORAL 2 TIMES DAILY
Qty: 14 TABLET | Refills: 0 | Status: SHIPPED | OUTPATIENT
Start: 2019-02-22 | End: 2019-05-21

## 2019-02-22 NOTE — RESULT ENCOUNTER NOTE
Felix,  Your urine has leukoesterase and blood which may be related to infection.  Rather than wait until the culture is back, I am sending a prescription to the pharmacy - [address].  Will get the culture result to you when it is available and change the antibiotic if needed.  WR

## 2019-02-22 NOTE — PATIENT INSTRUCTIONS
Referral for Home Care faxed to  Home Care   [phone]  [phone]  PAtient/family will be contacted to schedule a home visit.

## 2019-02-23 LAB — CULTURE: NORMAL

## 2019-02-25 ENCOUNTER — TELEPHONE (OUTPATIENT)
Dept: FAMILY MEDICINE | Facility: CLINIC | Age: 84
End: 2019-02-25

## 2019-02-25 DIAGNOSIS — F33.2 MAJOR DEPRESSIVE DISORDER, RECURRENT, SEVERE WITHOUT PSYCHOTIC FEATURES (H): ICD-10-CM

## 2019-02-25 NOTE — TELEPHONE ENCOUNTER
[facility] Family Medicine phone call message- patient requesting a refill:    Full Medication Name: methylphenidate (RITALIN)     Dose: 10 MG tablet    Pharmacy confirmed as    YES:   [facility] - [place], [place] - [address] [facility]  [address] [place] [address]  Phone: [phone] Fax: [phone]    [facility] - [place], [place] - [address] [facility]  [address] [place] [address]  Phone: [phone] Fax: [phone]    [facility] - [place], [place] - [address] [facility] & [place]  [address] [place] [address]  Phone: [phone] Fax: [phone]  : No:     Additional Comments: Patient states he is almost out of the medication listed above. He states [facility] gave a partial fill but is requesting a refill before he runs out. Notified it may take 2 business days. Please call and advise.      OK to leave a message on voice mail? Yes    Primary language: English      needed? No    Call taken on February 25, 2019 at 8:22 AM by Luz Pillai

## 2019-02-25 NOTE — TELEPHONE ENCOUNTER
I called to check up on the pt and help the pt setup a hospital follow up.  I talked to the pt wife, she stated that the pt is ok.  She stated that when the pt was discharged from the hospital, they were suppose to refer the pt for OT/PT in the home.  She stated that she has not heard anything.  She wanted to know if  can put in an order for OT/PT for the pt at home. She stated that the pt can not come into the clinic, or can he get up to go use the bathroom.  I told her that I will send this message to  and have him put in the order.

## 2019-02-26 ENCOUNTER — OFFICE VISIT (OUTPATIENT)
Dept: FAMILY MEDICINE | Facility: CLINIC | Age: 84
End: 2019-02-26
Payer: COMMERCIAL

## 2019-02-26 DIAGNOSIS — C61 MALIGNANT NEOPLASM OF PROSTATE (H): ICD-10-CM

## 2019-02-26 DIAGNOSIS — R45.84 ANHEDONIA: ICD-10-CM

## 2019-02-26 DIAGNOSIS — F34.1 DYSTHYMIA: ICD-10-CM

## 2019-02-26 DIAGNOSIS — R53.1 GENERALIZED WEAKNESS: Primary | ICD-10-CM

## 2019-02-26 RX ORDER — METHYLPHENIDATE HYDROCHLORIDE 10 MG/1
10 TABLET ORAL 2 TIMES DAILY
Qty: 62 TABLET | Refills: 0 | Status: SHIPPED | OUTPATIENT
Start: 2019-02-26 | End: 2019-03-21

## 2019-02-26 NOTE — TELEPHONE ENCOUNTER
Date of discharge: 02/24/2019  Facility of discharge: [facility]  Patient concerns about condition: No concerns at this time.  Patient concerns about medications: No concerns at this time.  Full med reconciliation will be completed at clinic visit.  Patient concerns about transitioning: No concerns at this time.  Clinic office visit appointment date: 02/26/2019  Patient reminded to bring all medications (prescription and over-the-counter) to clinic appointment: Yes    Using the date of discharge as day 1, the 30th day post discharge is 03/24/2019.

## 2019-02-27 NOTE — PROGRESS NOTES
SUBJECTIVE:  This is an 85-year-old male seen in a home visit at the request of he and his wife.  He is seen for hospital followup after being discharged from the hospital on 02/24.  He was admitted for generalized weakness.  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  His wife's main concern is that he has an immunocompromised system that needs gammaglobulin to resolve.  His main complaint other than the generalized weakness is that he has thick sputum with a low pH of 5.      He is on fluoxetine daily and methylphenidate 10 mg twice daily.  This combination keeps his energy level better.  He is able to do things on the computer and thinks relatively clearly.  He is not able to get up and about and requires the assistance of 2 people with the walker to get to the bathroom.  He is incontinent in his Depends on a fairly regular basis.  He also has unexplained and unexpected episodes of falling to the ground, which seem to not be drop attacks but more like a vasovagal or vasomotor abnormality.  He can feel it coming on as if he were about to faint.  He tries to rotate and sit on a walker/stool but usually lands on the ground.  He has not broken anything.  He does not seem to go down hard and end up with lacerations or any contusions.      He is looking for a home health evaluation and potentially home PT, OT and other assistance to manage his life better.  His wife and son probably could use some respite care also.  He recognizes that he has prostate cancer.  He is not concerned about dying from this.  He is not sure if he has a short time or a long time left with the cancer.  He is not having pain.      REVIEW OF SYMPTOMS:  He is having no chest pain, shortness of breath, abdominal discomfort, pains in his legs or arms.  No double vision or difficulty swallowing.  He has no focal symptoms.  He is not very active and is generally in his chair.      OBJECTIVE  APPEARANCE:  Well groomed.  He is sitting in a chair, slumped down with his legs extended onto a hassock.  He does not appear uncomfortable.  He is breathing easily.  His skin color is good.  He has no swelling in his ankles.      ASSESSMENT:   1.  Generalized weakness with inability to care for himself.   2.  Dysthymia and depression with psychomotor slowing.   3.  Loss of pleasure from usual activities.   4.  Malignant neoplasm of the prostate.      PLAN:  We will reorder his evaluation for home care and home PT, having seen him now face-to-face.  We will continue his current medications and I gave him his prescription ritalin for the coming month.  We will reevaluate on an as-needed basis.      The patient and wife involved in medical decision making throughout the visit.      Recheck as needed.       Reviewed discharge summary with Felix and spouse.

## 2019-02-27 NOTE — PATIENT INSTRUCTIONS
Order for home care evaluation.  Continue current medications.      Referral for Home Care faxed to  Home Care  [phone]  [phone]  Patient will be contacted to schedule a home visit.

## 2019-02-28 DIAGNOSIS — F34.1 DYSTHYMIA: ICD-10-CM

## 2019-02-28 RX ORDER — FLUOXETINE 40 MG/1
40 CAPSULE ORAL DAILY
Qty: 90 CAPSULE | Refills: 4 | Status: SHIPPED | OUTPATIENT
Start: 2019-02-28

## 2019-03-01 ENCOUNTER — HOME CARE/HOSPICE - HEALTHEAST (OUTPATIENT)
Dept: HOME HEALTH SERVICES | Facility: HOME HEALTH | Age: 84
End: 2019-03-01

## 2019-03-04 ENCOUNTER — RECORDS - HEALTHEAST (OUTPATIENT)
Dept: ADMINISTRATIVE | Facility: OTHER | Age: 84
End: 2019-03-04

## 2019-03-04 ENCOUNTER — TELEPHONE (OUTPATIENT)
Dept: FAMILY MEDICINE | Facility: CLINIC | Age: 84
End: 2019-03-04

## 2019-03-04 ENCOUNTER — HOME CARE/HOSPICE - HEALTHEAST (OUTPATIENT)
Dept: HOME HEALTH SERVICES | Facility: HOME HEALTH | Age: 84
End: 2019-03-04

## 2019-03-04 NOTE — TELEPHONE ENCOUNTER
Called Felicia and provided verbal order for okay for PT to eval and treat. Will route to PCP for review. Porfirio ADDISON

## 2019-03-04 NOTE — TELEPHONE ENCOUNTER
[facility] Family Medicine phone call message - order or referral request for patient:     Order or referral being requested: Need an okay for PT eval.      Additional Comments: Calling for verbal order above. Please call and advise.     OK to leave a message on voice mail? Yes    Primary language: English      needed? No    Call taken on March 4, 2019 at 3:39 PM by Doug Carreon

## 2019-03-05 ENCOUNTER — RECORDS - HEALTHEAST (OUTPATIENT)
Dept: ADMINISTRATIVE | Facility: OTHER | Age: 84
End: 2019-03-05

## 2019-03-05 ENCOUNTER — TELEPHONE (OUTPATIENT)
Dept: FAMILY MEDICINE | Facility: CLINIC | Age: 84
End: 2019-03-05

## 2019-03-05 ENCOUNTER — HOME CARE/HOSPICE - HEALTHEAST (OUTPATIENT)
Dept: HOME HEALTH SERVICES | Facility: HOME HEALTH | Age: 84
End: 2019-03-05

## 2019-03-05 NOTE — TELEPHONE ENCOUNTER
Called Felicia back to update clinic is aware and patient refusing to come into clinic or be seen in ED. No answer, left VM to call clinic back and request myself. Porfirio ADDISON

## 2019-03-05 NOTE — TELEPHONE ENCOUNTER
[facility] Family Medicine phone call message- general phone call:    Reason for call: Caller states the patient's current  is unavailable and patient notified her that he's having lots of pain in his right ankle. She states he has a chronic condition and is requesting pain medication. Please call and advise.     Return call needed: Yes    OK to leave a message on voice mail? Yes    Primary language: English      needed? No    Call taken on March 5, 2019 at 2:29 PM by Luz Pillai

## 2019-03-06 ENCOUNTER — MEDICAL CORRESPONDENCE (OUTPATIENT)
Dept: HEALTH INFORMATION MANAGEMENT | Facility: CLINIC | Age: 84
End: 2019-03-06

## 2019-03-06 DIAGNOSIS — R62.7 FAILURE TO THRIVE IN ADULT: ICD-10-CM

## 2019-03-06 NOTE — TELEPHONE ENCOUNTER
[facility] phone call message- patient requesting a refill:    Full Medication Name: Disposable Gloves (SYNTHETIC VINYL EXAM GLOVES) MISC    Dose:     Pharmacy confirmed as   [facility] - [place], [place] - [address] [facility]  [address] [place] [address]  Phone: [phone] Fax: [phone]    [facility] - [place], [place] - [address] [facility]  [address] [place] [address]  Phone: [phone] Fax: [phone]    [facility] - [place] - [address] [facility] [address] [place]  [address] [place] [address]  Phone: [phone] Fax: [phone]  : No:     [facility]: YES    Additional Comments: Patient is requesting a refill on disposable latex gloves. He states he [facility]. Please call and advise.      OK to leave a message on voice mail? Yes    Primary language: English      needed? No    Call taken on March 6, 2019 at 11:52 AM by Luz Pillai

## 2019-03-07 ENCOUNTER — HOME CARE/HOSPICE - HEALTHEAST (OUTPATIENT)
Dept: HOME HEALTH SERVICES | Facility: HOME HEALTH | Age: 84
End: 2019-03-07

## 2019-03-08 ENCOUNTER — HOME CARE/HOSPICE - HEALTHEAST (OUTPATIENT)
Dept: HOME HEALTH SERVICES | Facility: HOME HEALTH | Age: 84
End: 2019-03-08

## 2019-03-10 ENCOUNTER — HOME CARE/HOSPICE - HEALTHEAST (OUTPATIENT)
Dept: HOME HEALTH SERVICES | Facility: HOME HEALTH | Age: 84
End: 2019-03-10

## 2019-03-11 ENCOUNTER — HOME CARE/HOSPICE - HEALTHEAST (OUTPATIENT)
Dept: HOME HEALTH SERVICES | Facility: HOME HEALTH | Age: 84
End: 2019-03-11

## 2019-03-12 ENCOUNTER — HOME CARE/HOSPICE - HEALTHEAST (OUTPATIENT)
Dept: HOME HEALTH SERVICES | Facility: HOME HEALTH | Age: 84
End: 2019-03-12

## 2019-03-13 ENCOUNTER — HOME CARE/HOSPICE - HEALTHEAST (OUTPATIENT)
Dept: HOME HEALTH SERVICES | Facility: HOME HEALTH | Age: 84
End: 2019-03-13

## 2019-03-14 ENCOUNTER — HOME CARE/HOSPICE - HEALTHEAST (OUTPATIENT)
Dept: HOME HEALTH SERVICES | Facility: HOME HEALTH | Age: 84
End: 2019-03-14

## 2019-03-15 ENCOUNTER — HOME CARE/HOSPICE - HEALTHEAST (OUTPATIENT)
Dept: HOME HEALTH SERVICES | Facility: HOME HEALTH | Age: 84
End: 2019-03-15

## 2019-03-18 ENCOUNTER — HOME CARE/HOSPICE - HEALTHEAST (OUTPATIENT)
Dept: HOME HEALTH SERVICES | Facility: HOME HEALTH | Age: 84
End: 2019-03-18

## 2019-03-18 ENCOUNTER — MEDICAL CORRESPONDENCE (OUTPATIENT)
Dept: HEALTH INFORMATION MANAGEMENT | Facility: CLINIC | Age: 84
End: 2019-03-18

## 2019-03-19 ENCOUNTER — MEDICAL CORRESPONDENCE (OUTPATIENT)
Dept: HEALTH INFORMATION MANAGEMENT | Facility: CLINIC | Age: 84
End: 2019-03-19

## 2019-03-19 ENCOUNTER — HOME CARE/HOSPICE - HEALTHEAST (OUTPATIENT)
Dept: HOME HEALTH SERVICES | Facility: HOME HEALTH | Age: 84
End: 2019-03-19

## 2019-03-20 ENCOUNTER — HOME CARE/HOSPICE - HEALTHEAST (OUTPATIENT)
Dept: HOME HEALTH SERVICES | Facility: HOME HEALTH | Age: 84
End: 2019-03-20

## 2019-03-20 ENCOUNTER — MEDICAL CORRESPONDENCE (OUTPATIENT)
Dept: HEALTH INFORMATION MANAGEMENT | Facility: CLINIC | Age: 84
End: 2019-03-20

## 2019-03-21 ENCOUNTER — DOCUMENTATION ONLY (OUTPATIENT)
Dept: FAMILY MEDICINE | Facility: CLINIC | Age: 84
End: 2019-03-21

## 2019-03-21 DIAGNOSIS — F33.2 MAJOR DEPRESSIVE DISORDER, RECURRENT, SEVERE WITHOUT PSYCHOTIC FEATURES (H): ICD-10-CM

## 2019-03-21 RX ORDER — METHYLPHENIDATE HYDROCHLORIDE 10 MG/1
10 TABLET ORAL 2 TIMES DAILY
Qty: 62 TABLET | Refills: 0 | Status: SHIPPED | OUTPATIENT
Start: 2019-03-21 | End: 2019-03-21

## 2019-03-21 RX ORDER — METHYLPHENIDATE HYDROCHLORIDE 10 MG/1
10 TABLET ORAL 2 TIMES DAILY
Qty: 62 TABLET | Refills: 0 | Status: SHIPPED | OUTPATIENT
Start: 2019-05-22 | End: 2019-07-09

## 2019-03-21 RX ORDER — METHYLPHENIDATE HYDROCHLORIDE 10 MG/1
10 TABLET ORAL 2 TIMES DAILY
Qty: 62 TABLET | Refills: 0 | Status: SHIPPED | OUTPATIENT
Start: 2019-04-21 | End: 2019-03-21

## 2019-03-21 NOTE — PROGRESS NOTES
3 Rx has been filed on March 21, 2019 4:47 PM  By: Lilia Angela  Patient has been notified Rx ready for .

## 2019-03-21 NOTE — TELEPHONE ENCOUNTER
Patient is wondering if he can get 3 rx for 3 months, saves him the trouble. He could get someone to pick it up for him versus him picking one at a time. He would like it one for March, April, May. Please call and advise.

## 2019-03-22 ENCOUNTER — HOME CARE/HOSPICE - HEALTHEAST (OUTPATIENT)
Dept: HOME HEALTH SERVICES | Facility: HOME HEALTH | Age: 84
End: 2019-03-22

## 2019-03-25 ENCOUNTER — HOME CARE/HOSPICE - HEALTHEAST (OUTPATIENT)
Dept: HOME HEALTH SERVICES | Facility: HOME HEALTH | Age: 84
End: 2019-03-25

## 2019-03-26 ENCOUNTER — MEDICAL CORRESPONDENCE (OUTPATIENT)
Dept: HEALTH INFORMATION MANAGEMENT | Facility: CLINIC | Age: 84
End: 2019-03-26

## 2019-03-26 ENCOUNTER — TELEPHONE (OUTPATIENT)
Dept: FAMILY MEDICINE | Facility: CLINIC | Age: 84
End: 2019-03-26

## 2019-03-26 ENCOUNTER — HOME CARE/HOSPICE - HEALTHEAST (OUTPATIENT)
Dept: HOME HEALTH SERVICES | Facility: HOME HEALTH | Age: 84
End: 2019-03-26

## 2019-03-27 ENCOUNTER — HOME CARE/HOSPICE - HEALTHEAST (OUTPATIENT)
Dept: HOME HEALTH SERVICES | Facility: HOME HEALTH | Age: 84
End: 2019-03-27

## 2019-03-27 ENCOUNTER — TELEPHONE (OUTPATIENT)
Dept: FAMILY MEDICINE | Facility: CLINIC | Age: 84
End: 2019-03-27

## 2019-03-27 NOTE — TELEPHONE ENCOUNTER
[facility] Family Medicine phone call message - order or referral request for patient:     Order or referral being requested: Skilled nursing eval to complete labs and give patient some medications      Additional Comments:  Calling for order above. Call was left on our voice mail. Please call and advise.     OK to leave a message on voice mail?     Primary language: English      needed? No    Call taken on March 27, 2019 at 3:49 PM by Doug Carreon

## 2019-03-27 NOTE — TELEPHONE ENCOUNTER
Spoke with Guadalupe ADDISON, home care services ordered by [facility] since pt's care is still under [facility], they have recently received order to go and draw labs as well as give a medication injection to avoid Felix having to go into Urology for administration from Urology.  Given verbal approval for the requested order of going out to home for one time skilled nursing visit  Further lab orders and medication injection will come from or be given by Urology.  Action pending on receiving further specific orders from Urology. No further intervention is needed from primary physician at this time. Will route to inform Dr Mckeon of the completed action above. Reno ADDISON

## 2019-03-29 ENCOUNTER — HOME CARE/HOSPICE - HEALTHEAST (OUTPATIENT)
Dept: HOME HEALTH SERVICES | Facility: HOME HEALTH | Age: 84
End: 2019-03-29

## 2019-04-01 ENCOUNTER — TELEPHONE (OUTPATIENT)
Dept: FAMILY MEDICINE | Facility: CLINIC | Age: 84
End: 2019-04-01

## 2019-04-01 ENCOUNTER — MEDICAL CORRESPONDENCE (OUTPATIENT)
Dept: HEALTH INFORMATION MANAGEMENT | Facility: CLINIC | Age: 84
End: 2019-04-01

## 2019-04-01 ENCOUNTER — HOME CARE/HOSPICE - HEALTHEAST (OUTPATIENT)
Dept: HOME HEALTH SERVICES | Facility: HOME HEALTH | Age: 84
End: 2019-04-01

## 2019-04-01 NOTE — TELEPHONE ENCOUNTER
Spoke to Paula, informed her order to go out for this one time skilled nursing approved by [facility] but the specific orders were to given by Urology. Paula RN will contact [facility] to obtain specific orders. Reno ADDISON

## 2019-04-01 NOTE — TELEPHONE ENCOUNTER
[facility] Family Medicine phone call message - order or referral request for patient:     Order or referral being requested: Medication and lab draw      Additional Comments: Caller states she needs verbals orders for medication and lab draw. She states she needs to know what the lab draw is for and how will the patient get the medication shot of estrogen. Please call and advise. Detailed voicemail is ok if unable to answer call.     OK to leave a message on voice mail? Yes    Primary language: English      needed? No    Call taken on April 1, 2019 at 8:43 AM by Luz Pillai

## 2019-04-03 ENCOUNTER — HOME CARE/HOSPICE - HEALTHEAST (OUTPATIENT)
Dept: HOME HEALTH SERVICES | Facility: HOME HEALTH | Age: 84
End: 2019-04-03

## 2019-04-04 ENCOUNTER — HOME CARE/HOSPICE - HEALTHEAST (OUTPATIENT)
Dept: HOME HEALTH SERVICES | Facility: HOME HEALTH | Age: 84
End: 2019-04-04

## 2019-04-04 ENCOUNTER — MEDICAL CORRESPONDENCE (OUTPATIENT)
Dept: HEALTH INFORMATION MANAGEMENT | Facility: CLINIC | Age: 84
End: 2019-04-04

## 2019-04-11 ENCOUNTER — TELEPHONE (OUTPATIENT)
Dept: FAMILY MEDICINE | Facility: CLINIC | Age: 84
End: 2019-04-11

## 2019-04-11 ENCOUNTER — MEDICAL CORRESPONDENCE (OUTPATIENT)
Dept: HEALTH INFORMATION MANAGEMENT | Facility: CLINIC | Age: 84
End: 2019-04-11

## 2019-04-11 NOTE — TELEPHONE ENCOUNTER
[facility] Family Medicine phone call message - order or referral request for patient:     Order or referral being requested:       Additional Comments: Caller states order needs clarification and a diagnosis. She states she will fax the order again today since she hasn't heard a response from the last time the order was faxed on 03/27/19. She states order needs a diagnosis for gloves. Please call and advise.    OK to leave a message on voice mail? Yes    Primary language: English      needed? No    Call taken on April 11, 2019 at 3:39 PM by Luz Pillai

## 2019-04-12 ENCOUNTER — HOME CARE/HOSPICE - HEALTHEAST (OUTPATIENT)
Dept: HOME HEALTH SERVICES | Facility: HOME HEALTH | Age: 84
End: 2019-04-12

## 2019-04-12 ENCOUNTER — RECORDS - HEALTHEAST (OUTPATIENT)
Dept: ADMINISTRATIVE | Facility: OTHER | Age: 84
End: 2019-04-12

## 2019-04-15 ENCOUNTER — OFFICE VISIT - HEALTHEAST (OUTPATIENT)
Dept: GERIATRICS | Facility: CLINIC | Age: 84
End: 2019-04-15

## 2019-04-15 DIAGNOSIS — Z71.89 ADVANCED CARE PLANNING/COUNSELING DISCUSSION: ICD-10-CM

## 2019-04-15 DIAGNOSIS — R53.1 GENERALIZED WEAKNESS: ICD-10-CM

## 2019-04-15 DIAGNOSIS — I10 ESSENTIAL HYPERTENSION: ICD-10-CM

## 2019-04-15 DIAGNOSIS — C61 PROSTATE CANCER (H): ICD-10-CM

## 2019-04-16 ENCOUNTER — AMBULATORY - HEALTHEAST (OUTPATIENT)
Dept: ADMINISTRATIVE | Facility: CLINIC | Age: 84
End: 2019-04-16

## 2019-04-16 ENCOUNTER — OFFICE VISIT - HEALTHEAST (OUTPATIENT)
Dept: GERIATRICS | Facility: CLINIC | Age: 84
End: 2019-04-16

## 2019-04-16 DIAGNOSIS — R53.1 GENERALIZED WEAKNESS: ICD-10-CM

## 2019-04-16 DIAGNOSIS — C61 PROSTATE CANCER (H): ICD-10-CM

## 2019-04-16 DIAGNOSIS — I10 ESSENTIAL HYPERTENSION: ICD-10-CM

## 2019-04-17 ENCOUNTER — RECORDS - HEALTHEAST (OUTPATIENT)
Dept: LAB | Facility: CLINIC | Age: 84
End: 2019-04-17

## 2019-04-18 LAB
ANION GAP SERPL CALCULATED.3IONS-SCNC: 9 MMOL/L (ref 5–18)
BUN SERPL-MCNC: 16 MG/DL (ref 8–28)
CALCIUM SERPL-MCNC: 9.4 MG/DL (ref 8.5–10.5)
CHLORIDE BLD-SCNC: 102 MMOL/L (ref 98–107)
CO2 SERPL-SCNC: 28 MMOL/L (ref 22–31)
CREAT SERPL-MCNC: 0.77 MG/DL (ref 0.7–1.3)
ERYTHROCYTE [DISTWIDTH] IN BLOOD BY AUTOMATED COUNT: 12.1 % (ref 11–14.5)
GFR SERPL CREATININE-BSD FRML MDRD: >60 ML/MIN/1.73M2
GLUCOSE BLD-MCNC: 85 MG/DL (ref 70–125)
HCT VFR BLD AUTO: 36.6 % (ref 40–54)
HGB BLD-MCNC: 12 G/DL (ref 14–18)
MCH RBC QN AUTO: 32.5 PG (ref 27–34)
MCHC RBC AUTO-ENTMCNC: 32.8 G/DL (ref 32–36)
MCV RBC AUTO: 99 FL (ref 80–100)
PLATELET # BLD AUTO: 369 THOU/UL (ref 140–440)
PMV BLD AUTO: 8.5 FL (ref 8.5–12.5)
POTASSIUM BLD-SCNC: 4 MMOL/L (ref 3.5–5)
RBC # BLD AUTO: 3.69 MILL/UL (ref 4.4–6.2)
SODIUM SERPL-SCNC: 139 MMOL/L (ref 136–145)
WBC: 10 THOU/UL (ref 4–11)

## 2019-04-22 ENCOUNTER — OFFICE VISIT - HEALTHEAST (OUTPATIENT)
Dept: GERIATRICS | Facility: CLINIC | Age: 84
End: 2019-04-22

## 2019-04-22 DIAGNOSIS — G89.29 CHRONIC PAIN OF LEFT LOWER EXTREMITY: ICD-10-CM

## 2019-04-22 DIAGNOSIS — M79.605 CHRONIC PAIN OF LEFT LOWER EXTREMITY: ICD-10-CM

## 2019-04-22 DIAGNOSIS — R53.1 GENERALIZED WEAKNESS: ICD-10-CM

## 2019-04-23 ENCOUNTER — OFFICE VISIT - HEALTHEAST (OUTPATIENT)
Dept: GERIATRICS | Facility: CLINIC | Age: 84
End: 2019-04-23

## 2019-04-23 DIAGNOSIS — R53.1 GENERALIZED WEAKNESS: ICD-10-CM

## 2019-04-23 DIAGNOSIS — Z86.69 HISTORY OF ENCEPHALOPATHY: ICD-10-CM

## 2019-04-23 DIAGNOSIS — F43.22 ADJUSTMENT DISORDER WITH ANXIOUS MOOD: ICD-10-CM

## 2019-04-25 ENCOUNTER — RECORDS - HEALTHEAST (OUTPATIENT)
Dept: ADMINISTRATIVE | Facility: OTHER | Age: 84
End: 2019-04-25

## 2019-04-25 ENCOUNTER — OFFICE VISIT - HEALTHEAST (OUTPATIENT)
Dept: GERIATRICS | Facility: CLINIC | Age: 84
End: 2019-04-25

## 2019-04-25 DIAGNOSIS — I10 ESSENTIAL HYPERTENSION: ICD-10-CM

## 2019-04-25 DIAGNOSIS — F43.22 ADJUSTMENT DISORDER WITH ANXIOUS MOOD: ICD-10-CM

## 2019-04-25 DIAGNOSIS — R53.1 GENERALIZED WEAKNESS: ICD-10-CM

## 2019-04-26 ENCOUNTER — HOME CARE/HOSPICE - HEALTHEAST (OUTPATIENT)
Dept: HOME HEALTH SERVICES | Facility: HOME HEALTH | Age: 84
End: 2019-04-26

## 2019-04-26 ENCOUNTER — RECORDS - HEALTHEAST (OUTPATIENT)
Dept: ADMINISTRATIVE | Facility: OTHER | Age: 84
End: 2019-04-26

## 2019-04-28 ENCOUNTER — HOME CARE/HOSPICE - HEALTHEAST (OUTPATIENT)
Dept: HOME HEALTH SERVICES | Facility: HOME HEALTH | Age: 84
End: 2019-04-28

## 2019-04-29 ENCOUNTER — AMBULATORY - HEALTHEAST (OUTPATIENT)
Dept: GERIATRICS | Facility: CLINIC | Age: 84
End: 2019-04-29

## 2019-05-07 ENCOUNTER — TELEPHONE (OUTPATIENT)
Dept: FAMILY MEDICINE | Facility: CLINIC | Age: 84
End: 2019-05-07

## 2019-05-07 DIAGNOSIS — K59.01 SLOW TRANSIT CONSTIPATION: Primary | ICD-10-CM

## 2019-05-07 NOTE — TELEPHONE ENCOUNTER
[facility] Family Medicine phone call message - order or referral request for patient:     Order or referral being requested: Enima for bowel movement      Additional Comments: Patient's wife called stating the patient needs an order sent to [facility] for enima for bowel movements. [facility] medical number is [phone]. Please call and advise.     OK to leave a message on voice mail? Yes    Primary language: English      needed? No    Call taken on May 7, 2019 at 11:27 AM by Luz Pillai

## 2019-05-08 NOTE — TELEPHONE ENCOUNTER
Spoke with wife Luz, yes she is requesting an order for enema-saline. Verified on [facility] website and this is a product they have. Saline Enema, 4.5 fl oz bottle size. Reno ADDISON

## 2019-05-09 NOTE — TELEPHONE ENCOUNTER
Order has been faxed to [facility] [phone]. Wife Luz has been informed of the completed action. Reno ADDISON

## 2019-05-21 ENCOUNTER — OFFICE VISIT (OUTPATIENT)
Dept: FAMILY MEDICINE | Facility: CLINIC | Age: 84
End: 2019-05-21
Payer: COMMERCIAL

## 2019-05-21 VITALS
OXYGEN SATURATION: 96 % | HEART RATE: 71 BPM | DIASTOLIC BLOOD PRESSURE: 86 MMHG | TEMPERATURE: 96.9 F | SYSTOLIC BLOOD PRESSURE: 150 MMHG | RESPIRATION RATE: 22 BRPM

## 2019-05-21 DIAGNOSIS — I51.89 DIASTOLIC DYSFUNCTION: ICD-10-CM

## 2019-05-21 DIAGNOSIS — R45.84 ANHEDONIA: ICD-10-CM

## 2019-05-21 DIAGNOSIS — I35.0 AORTIC VALVE STENOSIS, ETIOLOGY OF CARDIAC VALVE DISEASE UNSPECIFIED: ICD-10-CM

## 2019-05-21 DIAGNOSIS — F33.2 SEVERE EPISODE OF RECURRENT MAJOR DEPRESSIVE DISORDER, WITHOUT PSYCHOTIC FEATURES (H): ICD-10-CM

## 2019-05-21 DIAGNOSIS — Z01.818 PREOP GENERAL PHYSICAL EXAM: Primary | ICD-10-CM

## 2019-05-21 DIAGNOSIS — R62.7 FAILURE TO THRIVE IN ADULT: ICD-10-CM

## 2019-05-21 DIAGNOSIS — E87.1 HYPONATREMIA: ICD-10-CM

## 2019-05-21 DIAGNOSIS — G47.33 OBSTRUCTIVE SLEEP APNEA: ICD-10-CM

## 2019-05-21 DIAGNOSIS — C61 MALIGNANT NEOPLASM OF PROSTATE (H): ICD-10-CM

## 2019-05-21 DIAGNOSIS — N20.0 KIDNEY STONE: ICD-10-CM

## 2019-05-21 PROBLEM — N39.0 UTI (URINARY TRACT INFECTION) WITH PYURIA: Status: ACTIVE | Noted: 2019-04-06

## 2019-05-21 LAB
BUN SERPL-MCNC: 12 MG/DL (ref 7–30)
CALCIUM SERPL-MCNC: 10 MG/DL (ref 8.5–10.4)
CHLORIDE SERPLBLD-SCNC: 100 MMOL/L (ref 94–109)
CO2 SERPL-SCNC: 30 MMOL/L (ref 20–32)
CREAT SERPL-MCNC: 1 MG/DL (ref 0.8–1.5)
EGFR CALCULATED (BLACK REFERENCE): >90 ML/MIN
EGFR CALCULATED (NON BLACK REFERENCE): 75.5 ML/MIN
GLUCOSE SERPL-MCNC: 119 MG/DL (ref 60–109)
HCT VFR BLD AUTO: 43.9 % (ref 40–53)
HEMOGLOBIN: 13.4 G/DL (ref 13.3–17.7)
MCH RBC QN AUTO: 30.9 PG (ref 26.5–35)
MCHC RBC AUTO-ENTMCNC: 30.5 G/DL (ref 32–36)
MCV RBC AUTO: 101.1 FL (ref 78–100)
PLATELET # BLD AUTO: 332 K/UL (ref 150–450)
POTASSIUM SERPL-SCNC: 4.6 MMOL/L (ref 3.4–5.3)
PSA SERPL-MCNC: 1.2 NG/ML (ref 0–6.5)
RBC # BLD AUTO: 4.34 M/UL (ref 4.4–5.9)
SODIUM SERPL-SCNC: 140 MMOL/L (ref 133–144)
WBC # BLD AUTO: 7.3 K/UL (ref 4–11)

## 2019-05-21 RX ORDER — CEFPODOXIME PROXETIL 200 MG/1
200 TABLET, FILM COATED ORAL 2 TIMES DAILY
Qty: 24 TABLET | Refills: 0 | Status: SHIPPED | OUTPATIENT
Start: 2019-05-21 | End: 2019-09-04

## 2019-05-21 NOTE — PATIENT INSTRUCTIONS
Presurgery Checklist  You are scheduled to have surgery. The healthcare staff will try to make your stay comfortable. Use the guidelines below to remind yourself what to do before surgery. Be sure to follow any specific pre-op instructions from your surgeon or nurse.   Preparing for Surgery  Ask your surgeon if you ll need a blood transfusion during surgery and if so, how to prepare for it. In some cases, you can donate blood before surgery. If needed, this blood can be given back (transfused) to you during or after surgery.  If you are having abdominal surgery, ask what you need to do to clear your bowel.  Tell your surgeon if you have allergies to any medications or foods.  Arrange for an adult family member or friend to drive you home after surgery. If possible, have someone ready to help you at home as you recover.  Call the surgeon if you get a cold, fever, sore throat, diarrhea, or other health problem just before surgery. Your surgeon can decide whether or not to postpone the surgery.  Medications  Tell your surgeon about all medications you take, including prescription and over-the-counter products such as herbal remedies and vitamins. Ask if you should continue taking them.  If you take ibuprofen, naproxen, or  blood thinners  such as aspirin, clopidogrel (Plavix), or warfarin (Coumadin), ask your surgeon whether you should stop taking them and how long before surgery you should stop.  You may be told to take antibiotics just before surgery to prevent infection. If so, follow instructions carefully on how to take them.  If you are told to take medications called anticoagulants to prevent blood clots after surgery, be sure to follow the instructions on how to take them.  Stop Smoking  If you smoke, healing may take longer. So at least 2 week(s) before surgery, stop smoking.  Bathing or Showering Before Surgery  If instructed, wash with antibacterial soap. Afterward, do not use lotions or powders.  If you  are having surgery on the head, you may be asked to shampoo with antibacterial soap. Follow instructions for doing so.  Do Not Remove Hair from the Surgery Site  Do not shave hair from the incision site, unless you are given specific instructions to do so. Usually, if hair needs to be removed, it will be done at the hospital right before surgery.  Don t Eat or Drink  Your doctor will tell you when to stop eating and drinking. If you do not follow your doctor's instructions, your procedure may be postponed or rescheduled for another day.  If your surgeon tells you to continue any medications, take them with small sips of water.  You can brush your teeth and rinse your mouth, but don t swallow any water.  Day of Surgery  Do not wear makeup. Do not use perfume, deodorant, or hairspray. Remove nail polish and artificial nails.  Leave jewelry (including rings), watches, and other valuables at home.  Be sure to bring health insurance cards or forms and a photo ID.  Bring a list of your medications (include the name, dose, how often you take them, and the time last dose was taken).  Arrive on time at the hospital or surgery facility  Pre-op faxed to fax number :  [phone] or [phone]  Location :  [facility]  Date of Surgery :  06/03/2019  By/Date :  05/23/2019 by Bert larsen

## 2019-05-21 NOTE — NURSING NOTE
Referral for (Test): Holmium Lazer Cystoscopic Removal Ureteral Stone   Location/Place/Provider: [facility] W/ Dr. Elie Hart  Date/Time: 6/3/19  Phone: [phone]  Fax: [phone]

## 2019-05-21 NOTE — LETTER
May 22, 2019      Felix Bangura  [address] [place] [address]        Dear Felix,    Your PSA is 1.2 which is up from previous levels.  We will forward this to your urologist.  I hope your surgery goes well.    Please see below for your test results.    Resulted Orders   Basic Metabolic Panel ([facility]) - Results < 1 hr   Result Value Ref Range    Glucose 119.0 (H) 60.0 - 109.0 mg/dL    Urea Nitrogen 12.0 7.0 - 30.0 mg/dL    Creatinine 1.0 0.8 - 1.5 mg/dL    Sodium 140.0 133.0 - 144.0 mmol/L    Potassium 4.6 3.4 - 5.3 mmol/L    Chloride 100.0 94.0 - 109.0 mmol/L    Carbon Dioxide 30.0 20.0 - 32.0 mmol/L    Calcium 10.0 8.5 - 10.4 mg/dL    eGFR Calculated (Non Black Reference) 75.5 >60.0 mL/min    eGFR Calculated (Black Reference) >90 >60.0 mL/min   [facility])   Result Value Ref Range    PSA 1.2 0.0 - 6.5 ng/mL    Narrative    Test performed by:  [facility]  [address] [place] [address]  Method is Abbott Prostate-Specific Antigen (PSA)  Standard-WHO 1st International (90:10)   CBC with Plt ([facility])   Result Value Ref Range    WBC 7.3 4.0 - 11.0 K/uL    RBC 4.34 (L) 4.40 - 5.90 M/uL    Hemoglobin 13.4 13.3 - 17.7 g/dL    Hematocrit 43.9 40.0 - 53.0 %    .1 (H) 78.0 - 100.0 fL    MCH 30.9 26.5 - 35.0 pg    MCHC 30.5 (L) 32.0 - 36.0 g/dL    Platelets 332.0 150.0 - 450.0 K/uL       If you have any questions, please call the clinic to make an appointment.    Sincerely,    Torres Mckeon MD

## 2019-05-21 NOTE — PROGRESS NOTES
Patient presents with:  Pre-Op Exam: Holmium Lazer Cystoscopic Removal Ureteral Stone  or  With Dr. Taveras on 6.3.19  Medication Reconciliation: Needs attention    /86   Pulse 71   Temp 96.9  F (36.1  C) (Tympanic)   Resp 22   SpO2 96%         [facility] [address] [place] [address]  Phone: [phone]  Fax: [phone]    5/21/2019    Adult PRE-OP Evaluation:    Felix Bangura, 11/20/1933 presents for pre-operative evaluation and assessment as requested  (See above)    Date of Surgery/ Procedure: Sheryl 3 2019  Hospital/Surgical Facility: [facility]     Primary Physician: Torres Mckeon  Type of Anesthesia Anticipated: General  History of anesthesia complications: NONE  History of  abnormal bleeding: NONE   History of blood transfusions: YES.  No complications.  Patient has a Health Care Directive or Living Will:  NO    Preoperative Questions   1. NO - Do you have a history of heart attack, stroke, stent, bypass or surgery on an artery in the head, neck, heart or legs?  2. YES - Do you ever have any pain or discomfort in your chest? Not currently  3. NO - Have you ever had a severe pain across the front of your chest lasting for half an hour or more?  4. NO - Do you have a history of Congestive Heart Failure?  5. YES - Are you troubled by shortness of breath when: walking on the level/ up a slight hill/ at night? deconditioned  6. NO - Does your chest ever sound wheezy or whistling?  7. NO - Do you currently have a cold, bronchitis or other respiratory infection?  8. NO - Have you had a cold, bronchitis or other respiratory infection within the last 2 weeks?  9. YES - Do you usually have a cough? Non productive no asthma  10. NO - Do you sometimes get pains in the calves of your legs when you walk?  11. NO - Do you or anyone in your family have previous history of blood clots?  12. NO - Do you or does anyone in your family have a serious bleeding  problem such as prolonged bleeding following surgeries or cuts?  13. NO - Have you ever had problems with anemia or been told to take iron pills?  14. NO - Have you had any abnormal blood loss such as black, tarry or bloody stools, or abnormal vaginal bleeding?  15. YES - Have you ever had a blood transfusion?  16. NO - Have you or any of your relatives ever had problems with anesthesia?  17. NO - Do you have sleep apnea, excessive snoring or daytime drowsiness?  18. NO - Do you have any prosthetic heart valves?  19. NO - Do you have prosthetic joints?  20. NO - Is there any chance that you may be pregnant?    Patient Active Problem List   Diagnosis     Health Care Home     Abdominal hernia Incisional Epigastric     Major depressive disorder, recurrent episode (H)     Complete rupture of rotator cuff     Diastolic dysfunction     Dysthymia     Hypercholesterolemia     Rotator cuff (capsule) sprain     Loss of Pleasure From Usual Activities ( Anhedonia)     Low back pain     Nevus, non-neoplastic     Obstructive sleep apnea     Osteoarthrosis     Seborrheic keratosis     Insomnia due to medical condition     Adjustment disorder with anxious mood     Aortic valve stenosis     Failure to thrive in adult     Malignant neoplasm of prostate (H)     Generalized weakness         Current Outpatient Medications on File Prior to Visit:  amLODIPine (NORVASC) 5 MG tablet Take 1 tablet (5 mg) by mouth daily   Cholecalciferol (VITAMIN D) 2000 units tablet Take 2,000 Units by mouth daily   Disposable Gloves (SYNTHETIC VINYL EXAM GLOVES) MISC 1 Package 5 times daily   FLUoxetine (PROZAC) 40 MG capsule Take 1 capsule (40 mg) by mouth daily   losartan (COZAAR) 50 MG tablet Take 1 tablet (50 mg) by mouth daily   [START ON 5/22/2019] methylphenidate (RITALIN) 10 MG tablet Take 1 tablet (10 mg) by mouth 2 times daily   metoprolol succinate (TOPROL-XL) 25 MG 24 hr tablet Take 1 tablet (25 mg) by mouth daily   nitroGLYcerin (NITROSTAT) 0.4  MG sublingual tablet Place 1 tablet (0.4 mg) under the tongue every 5 minutes as needed for chest pain   order for DME Equipment being ordered: Saline Enema, 4.5 fl oz bottle size   order for DME Equipment being ordered: gloves for cares6 times per day (12 gloves)Dispense 400 per monthRefill 11   [] sulfamethoxazole-trimethoprim (BACTRIM DS/SEPTRA DS) 800-160 MG tablet Take 1 tablet by mouth 2 times daily for 7 days   tamsulosin (FLOMAX) 0.4 MG capsule Take 1 tab by mouth daily. (pharmacy - please fill 90 day supply)   traZODone (DESYREL) 50 MG tablet Take 0.5 tablets (25 mg) by mouth At Bedtime     No current facility-administered medications on file prior to visit.     OTC products: None, except as noted above    Allergies   Allergen Reactions     Aspirin      Legs feel funny, pain in knee     Clam Shell      Dust Mites      Latex Allergy: NO    Social History     Socioeconomic History     Marital status:      Spouse name: Not on file     Number of children: Not on file     Years of education: Not on file     Highest education level: Not on file   Occupational History     Not on file   Social Needs     Financial resource strain: Not on file     Food insecurity:     Worry: Not on file     Inability: Not on file     Transportation needs:     Medical: Not on file     Non-medical: Not on file   Tobacco Use     Smoking status: Former Smoker     Smokeless tobacco: Never Used     Tobacco comment:  quit   Substance and Sexual Activity     Alcohol use: Yes     Drug use: No     Sexual activity: Not on file   Lifestyle     Physical activity:     Days per week: Not on file     Minutes per session: Not on file     Stress: Not on file   Relationships     Social connections:     Talks on phone: Not on file     Gets together: Not on file     Attends Yazidi service: Not on file     Active member of club or organization: Not on file     Attends meetings of clubs or organizations: Not on file     Relationship  status: Not on file     Intimate partner violence:     Fear of current or ex partner: Not on file     Emotionally abused: Not on file     Physically abused: Not on file     Forced sexual activity: Not on file   Other Topics Concern     Parent/sibling w/ CABG, MI or angioplasty before 65F 55M? Not Asked   Social History Narrative     Not on file       REVIEW OF SYSTEMS:   Constitutional, HEENT, cardiovascular, pulmonary, gi and gu systems are negative, except as otherwise noted.    EXAM:     Patient Vitals for the past 24 hrs:   BP Temp Temp src Pulse Resp SpO2 Height Weight   05/21/19 1524 150/86 -- -- -- -- -- -- --   05/21/19 1520 158/90 96.9  F (36.1  C) Tympanic 71 22 96 % -- --     There is no height or weight on file to calculate BMI.  GENERAL: Wheelchair bound, generally healthy, alert and no distress  EYES: Eyes grossly normal to inspection, extraocular movements - intact, and PERRL  HENT: ear canals- normal; TMs- normal; Nose- normal; Mouth- no ulcers, no lesions  NECK: no tenderness, no adenopathy, no asymmetry, no masses, no stiffness; thyroid- normal to palpation  RESP: lungs clear to auscultation - no rales, no rhonchi, no wheezes  CV: regular rates and rhythm, normal S1 S2, no S3 or S4 and JAVIER, no click or rub -  ABDOMEN: soft, no tenderness, no  hepatosplenomegaly, no masses, normal bowel sounds  MS: extremities- no gross deformities noted, no edema, not able to walk without support or assistance  SKIN: no suspicious lesions, no rashes  PSYCH: Alert and oriented times 3; speech- coherent , normal rate and volume; able to articulate logical thoughts  LYMPHATICS: ant. cervical- normal, post. cervical- normal    DIAGNOSTICS:      See recent [facility] hospitalization records for ECG and labs    RISK ASSESSMENT:     Cardiovascular Risk:   -Patient is wheelchair bound without chest pain.  -The patient does not have chest pain at rest.  -Patient does have a history of congestive heart failure (dyastolic).     -The patient does not have a history of stroke and does have a history of valvular disease (aortic stenosis) .    Pulmonary Risk:  -In terms of risk factors for pulmonary complication, the patient is older then 60 and ANA    Perioperative Complications:  -The patient does not have a history of bleeding or clotting problems in the past.    -The patient has not had complications from surgeries.    -The patient does not have a family history of any anesthesia or surgical complications.      IMPRESSION:   Reason for surgery/procedure: Kidney stone - right ureter    (Z01.818) Preop general physical exam  (primary encounter diagnosis)  Plan: Basic Metabolic Panel ([facility]) - Results < 1         hr, CBC with Plt ([facility]), CANCELED: CBC w/         Plt. ([facility])          (N20.0) Kidney stone  Plan: cefpodoxime (VANTIN) 200 MG tablet          (G47.33) Obstructive sleep apnea    (R62.7) Failure to thrive in adult    (I35.0) Aortic valve stenosis, etiology of cardiac valve disease unspecified    (I51.9) Diastolic dysfunction    (C61) Malignant neoplasm of prostate (H)  Plan: PSA Diagnostic ([facility])          (F33.2) Severe episode of recurrent major depressive disorder, without psychotic features (H)    (R45.84) Loss of Pleasure From Usual Activities ( Anhedonia)    (E87.1) Hyponatremia - RESOLVED  Plan: Basic Metabolic Panel ([facility]) - Results < 1 hr             The proposed surgical procedure is considered INTERMEDIATE risk.    For above listed surgery and anesthesia:   Patient is at moderate risk for surgery/procedure and perioperative/procedure complications.    RECOMMENDATIONS:     Labs:  See hospital record for recent labs  BMP and CBC today.  PSA today  Last Comprehensive Metabolic Panel:  Sodium   Date Value Ref Range Status   05/21/2019 140.0 133.0 - 144.0 mmol/L Final     Potassium   Date Value Ref Range Status   05/21/2019 4.6 3.4 - 5.3 mmol/L Final     Chloride   Date Value Ref Range Status   05/21/2019  100.0 94.0 - 109.0 mmol/L Final     Carbon Dioxide   Date Value Ref Range Status   05/21/2019 30.0 20.0 - 32.0 mmol/L Final     Anion Gap   Date Value Ref Range Status   03/31/2015 7 3 - 14 mmol/L Final     Glucose   Date Value Ref Range Status   05/21/2019 119.0 (H) 60.0 - 109.0 mg/dL Final     Urea Nitrogen   Date Value Ref Range Status   05/21/2019 12.0 7.0 - 30.0 mg/dL Final     Creatinine   Date Value Ref Range Status   05/21/2019 1.0 0.8 - 1.5 mg/dL Final     GFR Estimate   Date Value Ref Range Status   05/16/2017 >60 >60 mL/min/1.73m2 Final     Calcium   Date Value Ref Range Status   05/21/2019 10.0 8.5 - 10.4 mg/dL Final     Lab Results   Component Value Date    WBC 5.8 03/31/2015     Lab Results   Component Value Date    RBC 4.31 03/31/2015     Lab Results   Component Value Date    HGB 13.4 05/21/2019     Lab Results   Component Value Date    HCT 43.9 05/21/2019     No components found for: MCT  Lab Results   Component Value Date    .1 05/21/2019     Lab Results   Component Value Date    MCH 30.9 05/21/2019     Lab Results   Component Value Date    MCHC 30.5 05/21/2019     Lab Results   Component Value Date    RDW 11.6 03/31/2015     Lab Results   Component Value Date     03/31/2015           Fasting:  NPO for 12 hours prior to surgery    Preop Plan:  --Approval given to proceed with proposed procedure, without further diagnostic evaluation    Medications:  Patient should take their regular medications the morning of surgery unless otherwise instructed.      PSA today.  BMP today      Torres Mckeon MD    Please contact our office if there are any further questions or information required about this patient.

## 2019-05-22 NOTE — RESULT ENCOUNTER NOTE
Felix,  Good to see you in clinic yesterday.  Your PSA is 1.2 which is up from previous levels.  We will forward this to your urologist.  I hope your surgery goes well.  NHUNG

## 2019-06-10 DIAGNOSIS — I10 ESSENTIAL HYPERTENSION: ICD-10-CM

## 2019-06-11 RX ORDER — LOSARTAN POTASSIUM 50 MG/1
50 TABLET ORAL DAILY
Qty: 90 TABLET | Refills: 3 | Status: SHIPPED | OUTPATIENT
Start: 2019-06-11 | End: 2021-01-01

## 2019-06-20 DIAGNOSIS — G47.00 INSOMNIA, UNSPECIFIED TYPE: ICD-10-CM

## 2019-06-20 RX ORDER — TRAZODONE HYDROCHLORIDE 50 MG/1
25 TABLET, FILM COATED ORAL AT BEDTIME
Qty: 45 TABLET | Refills: 3 | Status: SHIPPED | OUTPATIENT
Start: 2019-06-20 | End: 2020-01-01

## 2019-07-09 DIAGNOSIS — F33.2 MAJOR DEPRESSIVE DISORDER, RECURRENT, SEVERE WITHOUT PSYCHOTIC FEATURES (H): ICD-10-CM

## 2019-07-09 RX ORDER — METHYLPHENIDATE HYDROCHLORIDE 10 MG/1
10 TABLET ORAL 2 TIMES DAILY
Qty: 62 TABLET | Refills: 0 | Status: SHIPPED | OUTPATIENT
Start: 2019-07-09 | End: 2019-08-09

## 2019-07-09 NOTE — TELEPHONE ENCOUNTER
[facility] phone call message- patient requesting a refill:    Full Medication Name: methylphenidate (RITALIN)     Dose: 10 MG tablet    Pharmacy confirmed as   [id number] - [place], [place] - [address] [facility]  [address] [place] [address]  Phone: [phone] Fax: [phone]    [facility] - [place], [place] - [address] [facility]  [address] [place] [address]  Phone: [phone] Fax: [phone]    [id number] - [place], [place] - [address] [facility] [address]  [address] [place] [address]  Phone: [phone] Fax: [phone]    [facility]  [address] [place] [address]  Phone: [phone] Fax: [phone]  : No:     Additional Comments: Patient is requesting a refill for the medication listed above. He states he would like 3 separate prescriptions for the medication to cover for the months of July, August and September. Please call and advise.     OK to leave a message on voice mail? Yes    Primary language: English      needed? No    Call taken on July 9, 2019 at 9:19 AM by Luz Pillai

## 2019-07-09 NOTE — TELEPHONE ENCOUNTER
Message to physician: Patient called and requesting a refill    Date of last visit: 5/21/2019     Date of next visit if scheduled: Visit date not found      Last Comprehensive Metabolic Panel:  Sodium   Date Value Ref Range Status   05/21/2019 140.0 133.0 - 144.0 mmol/L Final     Potassium   Date Value Ref Range Status   05/21/2019 4.6 3.4 - 5.3 mmol/L Final     Chloride   Date Value Ref Range Status   05/21/2019 100.0 94.0 - 109.0 mmol/L Final     Carbon Dioxide   Date Value Ref Range Status   05/21/2019 30.0 20.0 - 32.0 mmol/L Final     Anion Gap   Date Value Ref Range Status   03/31/2015 7 3 - 14 mmol/L Final     Glucose   Date Value Ref Range Status   05/21/2019 119.0 (H) 60.0 - 109.0 mg/dL Final     Urea Nitrogen   Date Value Ref Range Status   05/21/2019 12.0 7.0 - 30.0 mg/dL Final     Creatinine   Date Value Ref Range Status   05/21/2019 1.0 0.8 - 1.5 mg/dL Final     GFR Estimate   Date Value Ref Range Status   05/16/2017 >60 >60 mL/min/1.73m2 Final     Calcium   Date Value Ref Range Status   05/21/2019 10.0 8.5 - 10.4 mg/dL Final       BP Readings from Last 3 Encounters:   05/21/19 150/86   07/02/18 112/69   07/31/17 103/66       No results found for: A1C             Please complete refill and CLOSE ENCOUNTER.  Closing the encounter signifies the refill is complete.

## 2019-08-01 ENCOUNTER — DOCUMENTATION ONLY (OUTPATIENT)
Dept: FAMILY MEDICINE | Facility: CLINIC | Age: 84
End: 2019-08-01

## 2019-08-09 DIAGNOSIS — F33.2 MAJOR DEPRESSIVE DISORDER, RECURRENT, SEVERE WITHOUT PSYCHOTIC FEATURES (H): ICD-10-CM

## 2019-08-09 RX ORDER — METHYLPHENIDATE HYDROCHLORIDE 10 MG/1
10 TABLET ORAL 2 TIMES DAILY
Qty: 62 TABLET | Refills: 0 | Status: SHIPPED | OUTPATIENT
Start: 2019-08-09 | End: 2019-08-19

## 2019-08-09 NOTE — TELEPHONE ENCOUNTER
[facility] phone call message- patient requesting a refill:    Full Medication Name: methylphenidate (RITALIN)     Dose: 10 MG tablet    Pharmacy confirmed as  YES:   [id number] - [place], [place] - [address] [facility] [address] [facility]  [address] [place] [address]  Phone: [phone] Fax: [phone]    [facility] - [place] - [address] [facility]  [address] [place] [address]  Phone: [phone] Fax: [phone]    [id number] - [place], [place] - [address] [facility] [address] [place] [address]  Phone: [phone] Fax: [phone]    [facility]  [address] [place] [address]  Phone: [phone] Fax: [phone]  : No:     Additional Comments: Patient states he needs a refill for the medication listed above. Please call and advise.      OK to leave a message on voice mail? Yes    Primary language: English      needed? No    Call taken on August 9, 2019 at 3:28 PM by Luz Pillai

## 2019-08-16 ENCOUNTER — TELEPHONE (OUTPATIENT)
Dept: FAMILY MEDICINE | Facility: CLINIC | Age: 84
End: 2019-08-16

## 2019-08-16 NOTE — TELEPHONE ENCOUNTER
Patient states he would like to know if prescription for Ritalin is ready for . He states his son will . Please call and advise once rx is available for pickup.

## 2019-08-19 DIAGNOSIS — F33.2 MAJOR DEPRESSIVE DISORDER, RECURRENT, SEVERE WITHOUT PSYCHOTIC FEATURES (H): ICD-10-CM

## 2019-08-19 RX ORDER — METHYLPHENIDATE HYDROCHLORIDE 10 MG/1
10 TABLET ORAL 2 TIMES DAILY
Qty: 62 TABLET | Refills: 0 | Status: SHIPPED | OUTPATIENT
Start: 2019-08-19 | End: 2019-09-04

## 2019-08-19 NOTE — TELEPHONE ENCOUNTER
Received printed rx approval for fill. I called Felix who informs me he has already received same prescription for Methylphenidate  10 mg #62 from [facility] and will not be in need of this one at [facility].     Controlled Substance Printed Prescription Disposal   Date: 08/19/19  Time: 4:27pm  Epic prescription number: [id number]  Medication name: Methylphenidate   Strength: 10 mg  Prescription quantity: 62  Reason for disposal: as stated above  Prescription shredded: Yes Witnessed by: Dr Torres Khanna RN

## 2019-08-20 NOTE — PROGRESS NOTES
"Visit to the client's home for annual health risk assessment.  An  was not needed.     Current situation/living environment  Felix is an 85 year old male who lives in a single family home with his spouse and his adult son. He has 3 children total, and he wife runs a day care of out of their home, but currently does not have any children enrolled. In the last 6 months, he reports one fall, and has been to the hospital for abdominal pain. He ended up having kidney stones removed and prior to this, spent time in a TCU for strengthening.      Activities of daily living (ADL)/instrumental activities of daily living (IADL) and functional issues  Client needs help with the following ADL's: dressing, grooming, bathing, bed mobility, transfers, walking and toileting (lots of cues, prompts, and encouragement--a lot of it is related to what he calls \"Lack of Will\").  Client needs help with the following IADL's: shopping, cooking, housekeeping, laundry, managing finances/bills and transportation  Client states he is unable to perform the above due to Osteoarthritis, general weakness, \"Lack of Will\", failure to thrive, low back pain, Major Depressive disorder.        Health concerns for today  He has no new health complaints. He has been seeing a therapist that comes to his home every few weeks. Had tried this in the past but did not mesh well with the first therapist, and is now satisfied with the lady coming from [facility]. Last year, he was suffering from agoraphobia, but he reports that he seems to have less fear getting out of the house. It is difficult, physically, to get out, and they are exploring having a ramp installed.     Has patient fallen 2 or more times in the last year? No  Has patient fallen with injury in the last year? No     Cognition/mental health  See above-no new cognition issues, just increased concerns about \"Lack of Will\", and his inability to do his own cares. "   STARS/Med Adherence  Client is non-compliant with the following quality measures: NA  Comments: Encouraged Felix to get a Medicare Wellness Exam at the clinic. Shared the Health Promotion that [facility] offers to [facility] members.     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 His spouse, Luz, and son Isrrael are employed by him to provide care. Also gets DME through [employer], such as incontinence products and gloves. He is thinking of getting off of the CDCS program and going to a traditional PCA program. This is TBD. He is also thinking about having a ramp installed at his house, since there are many stairs to get in and out, making it difficult to get him out for appointments.    Lyndsey Winkler, APARNA  [phone]

## 2019-09-04 ENCOUNTER — OFFICE VISIT (OUTPATIENT)
Dept: FAMILY MEDICINE | Facility: CLINIC | Age: 84
End: 2019-09-04
Payer: COMMERCIAL

## 2019-09-04 VITALS
SYSTOLIC BLOOD PRESSURE: 104 MMHG | HEART RATE: 67 BPM | TEMPERATURE: 97.5 F | WEIGHT: 164 LBS | OXYGEN SATURATION: 94 % | HEIGHT: 68 IN | DIASTOLIC BLOOD PRESSURE: 67 MMHG | BODY MASS INDEX: 24.86 KG/M2

## 2019-09-04 DIAGNOSIS — Z00.00 WELLNESS EXAMINATION: Primary | ICD-10-CM

## 2019-09-04 DIAGNOSIS — R54 FRAIL ELDERLY: ICD-10-CM

## 2019-09-04 DIAGNOSIS — I10 BENIGN ESSENTIAL HYPERTENSION: ICD-10-CM

## 2019-09-04 DIAGNOSIS — Z00.00 ENCOUNTER FOR MEDICARE ANNUAL WELLNESS EXAM: Primary | ICD-10-CM

## 2019-09-04 PROBLEM — N39.0 UTI (URINARY TRACT INFECTION) WITH PYURIA: Status: RESOLVED | Noted: 2019-04-06 | Resolved: 2019-09-04

## 2019-09-04 PROBLEM — E55.9 HYPOVITAMINOSIS D: Status: ACTIVE | Noted: 2019-09-04

## 2019-09-04 LAB
CHOLEST SERPL-MCNC: 294 MG/DL
CHOLEST/HDLC SERPL: 4.2 RATIO
HDLC SERPL-MCNC: 70 MG/DL
LDLC SERPL CALC-MCNC: 163 MG/DL (ref 0–99)
TRIGL SERPL-MCNC: 307 MG/DL
VLDL-CHOLESTEROL: 61 MG/DL (ref 7–32)

## 2019-09-04 RX ORDER — BICALUTAMIDE 50 MG/1
TABLET, FILM COATED ORAL
Refills: 0 | COMMUNITY
Start: 2019-07-23

## 2019-09-04 RX ORDER — METHYLPHENIDATE HYDROCHLORIDE 27 MG/1
TABLET ORAL
Refills: 0 | COMMUNITY
Start: 2019-08-23 | End: 2021-01-01

## 2019-09-04 RX ORDER — CHOLECALCIFEROL (VITAMIN D3) 50 MCG
2000 TABLET ORAL DAILY
Qty: 100 TABLET | Refills: 3 | Status: SHIPPED | OUTPATIENT
Start: 2019-09-04

## 2019-09-04 ASSESSMENT — MIFFLIN-ST. JEOR: SCORE: 1403.4

## 2019-09-04 NOTE — PROGRESS NOTES
Medicare Wellness Visit         HPI       This 85 year old male presents as an established patient of  Torres Mckeon who presents for an initial Medicare Wellness Exam.    Patient speaks English and so an  was not used.    Patient Active Problem List   Diagnosis     Health Care Home     Abdominal hernia Incisional Epigastric     Major depressive disorder, recurrent episode (H)     Complete rupture of rotator cuff     Diastolic dysfunction     Dysthymia     Hypercholesterolemia     Low back pain     Nevus, non-neoplastic     Obstructive sleep apnea     Osteoarthrosis     Seborrheic keratosis     Insomnia due to medical condition     Adjustment disorder with anxious mood     Aortic valve stenosis     Failure to thrive in adult     Malignant neoplasm of prostate (H)     Generalized weakness     Renal calculus, right     Hypovitaminosis D     Active problem list reviewed and updated.    Past Medical History:   Diagnosis Date     Chronic Major Depression 11/26/2012     Encephalopathy acute 7/31/2016     Essential hypertension 11/26/2012     Osteoarthrosis 11/26/2012     Prostate cancer (H) 8/14/2013    Stage 2tb      Past medical history reviewed and updated.    Past Surgical History:   Procedure Laterality Date     ADENOIDECTOMY       APPENDECTOMY       Incision and Removal of Subcutaneous Foreign Body       REMOVE IMPACTED EAR WAX       TONSILLECTOMY       Past surgical history reviewed and updated.     Family History     Problem (# of Occurrences) Relation (Name,Age of Onset)    Cancer (1) Father: skin    Cardiovascular (1) Sister    EYE* (1) Mother: Glaucoma        Family history reviewed and updated.         Review of Systems:     Constitutional: Any fevers or night sweats?  YES - known, have been experiencing hot flashes, side effect to receiving estrogen injections every 6 months.     Eyes:  Vision problems   No      Hearing Do you feel you have hearing loss?  No      Cardiovascular: Any chest pain,  fast or irregular heart beat, calf pain with walking? No            Respiratory:   Any breathing problems or cough?    YES - chronic, shortness of breath with minimal physical activity.     Gastrointestinal: Any stomach or stool problems?   No       Genitourinary: Do you have difficulty controlling urination?    YES - wears Depends for urinary incontinence. Denies dysuria or further concerns/issues.     Muscles and Joints: Any joint stiffness or soreness?    YES - c/o chronic pain. Left ankle fracture occurred x 10 years ago, experience pain once a day. Take Tylenol and Advil when needed, has been effective. Along with use of positive energy thinking.     Skin: Any concerning lesions or moles?   No      Nervous System: Any loss of strength or feeling, numbness or tingling, shaking, dizziness, or headache?   YES - c/o generalized body loss of strength. Uses walker or wheelchair to get around the house. Unable to ambulate by self without assistive devices.     Mental Health: Any depression, anxiety or problems sleeping?    No- taking Prozac with good effectiveness, stable and not currently an issue.     Cognition: Do you have any problems with your memory?  No           Medical Care   What other specialists or organizations are involved in your medical care?  Psychiatrist, Dr Jerod Bentley, [facility] Dr Reggie Singh  Patient Care Team        Relationship Specialty Notifications Start End     Torres Mckeon MD PCP - General Family Practice   10/21/14       Phone: [phone] Fax: [phone]              [facility] [id number]     Lyndsey Winkler      12/10/12       [facility]  [phone]          Have you been to the ER or overnight in the hospital in the last year?   YES - 4/2019 for generalized weakness, Kidney stone and UTI.         Social History     Social History     Tobacco Use     Smoking status: Former Smoker     Smokeless tobacco: Never Used      Tobacco comment: 1978 quit   Substance Use Topics     Alcohol use: Yes     Social History     Social History Narrative    Retired. Lives with his wife and 3 children. His son assists him with driving, medication management. Uses a 4WW or wheelchair for ambulation.     Social history reviewed and updated.    Do you feel threatened or controlled by a partner, ex-partner or anyone in your life? No  Has anyone hurt you physically, for example by pushing, hitting, slapping or kicking you   or forcing you to have sex? No      RISK BEHAVIORS AND HEALTHY HABITS:   How many meals or snacks do you eat daily? 3  Weight has decreased 10 pounds over last year   How often do you exercise and what do you do? Does not exercise  Do you frequently ride without a seatbelt? No  Do you use tobacco?  No  Do you use any other drugs? No         Do you use alcohol? Yes - special occasions only    FUNCTIONAL ABILITY/SAFETY SCREENING   Does your home have any of the following safety concerns? Loose rugs in the hallway, no grab bars in the bathroom, no handrails on the stairs or have poorly lit areas?  No    GET UP AND GO TEST: Was the patient's timed Up & Go test unsteady or longer than 30 seconds? Unable to perform given patient in wheelchair, cannot stand without assist of 1 or walker.  Do you need help with the phone, transportation, shopping, preparing meals, housework, laundry, medications or managing money? Yes - family performs these tasks for him  Have you noticed any hearing difficulties? Yes - wears hearing aids  Hearing evaluation if done: No    EVALUATION OF COGNITIVE FUNCTION   Mood/affect:Normal  Appearance:Normal  Family member/caregiver input: Normal    MINI COG  Interpretation: normal for age    SCREENING FOR PREVENTION and EARLY DETECTIOM     Advanced Directives: Discussed and patient desires to be DNI.  I have verified the patient's ablity to prepare an advanced directive/make health care decisions.  Literature was  "provided to assist patient in preparing an advanced directive.    Immunization History   Administered Date(s) Administered     Influenza (High Dose) 3 valent vaccine 11/04/2015     Influenza (IIV3) PF 10/27/2010, 09/13/2011, 09/13/2012, 10/07/2013     Influenza Vaccine IM > 6 months Valent IIV4 04/09/2015     Pneumo Conj 13-V (2010&after) 05/16/2017     Pneumococcal 23 valent 11/07/2002     TD (ADULT, 7+) 01/01/1997     TDAP Vaccine (Boostrix) 09/04/2019     Tdap (Adacel,Boostrix) 08/01/2007     Reviewed Immunization Record Today  Shingles Vaccine: information provided  TDaP:completed today         Physical Exam:   Vitals: /67   Pulse 67   Temp 97.5  F (36.4  C) (Oral)   Ht 1.727 m (5' 8\")   Wt 74.4 kg (164 lb)   SpO2 94%   BMI 24.94 kg/m     BMI= Body mass index is 24.94 kg/m .  GENERAL APPEARANCE: healthy, alert and no distress  EYES: Eyes grossly normal to inspection, PERRL and conjunctivae and sclerae normal  HENT: bilateral hearing aids in place, nose and mouth without ulcers or lesions, oral mucous membranes moist  NECK: no adenopathy, no asymmetry, no masses  RESP: lungs clear to auscultation - no rales, rhonchi or wheezes  CV: regular rates and rhythm, normal S1 S2, no peripheral edema and peripheral pulses strong  ABDOMEN: soft, nontender  MS: no musculoskeletal defects are noted   SKIN: no suspicious lesions or rashes on exposed skin  NEURO: Sensory exam grossly normal, mentation intact and speech normal  PSYCH: mentation appears normal and affect normal/bright    Assessment and Plan   Initial Medicare Wellness Exam    PLAN:  1.Reviewed preventive health counseling, as reflected in patient instructions  Special attention given to:       Healthy diet/nutrition       Dental care       Fall risk prevention       Immunizations    Vaccinated for: DIEGO [place] was seen today for medicare visit.    Diagnoses and all orders for this visit:    Encounter for Medicare annual wellness exam  -     Lipid " Panel ([facility])  -     TDAP VACCINE (BOOSTRIX)  -     DNI    Benign essential hypertension  Blood pressure low end of normal today. Will discontinue patient's amlodipine. If patient's blood pressure continue to be lower or patient having symptoms, would consider decreasing losartan dose as next step.    Frail elderly  -     vitamin D3 (CHOLECALCIFEROL) 2000 units (50 mcg) tablet; Take 1 tablet (2,000 Units) by mouth daily      Appropriate preventive services were discussed with this patient, including applicable screening as appropriate for cardiovascular disease, diabetes, osteopenia/osteoporosis, and glaucoma.  As appropriate for age/gender, discussed screening for colorectal cancer, prostate cancer, breast cancer, and cervical cancer. Checklist reviewing preventive services available has been given to the patient.    Reviewed patients plan of care and provided an AVS. The Basic Care Plan (routine screening as documented in Health Maintenance) meets the Care Plan requirement. This Care Plan has been established and reviewed with the Patient.    Jana Lebron MD  [facility] Medicine Resident    Precepted with: Torres Mckeon MD

## 2019-09-04 NOTE — PROGRESS NOTES
Preceptor Attestation:   Patient seen, evaluated and discussed with the resident. I have verified the content of the note, which accurately reflects my assessment of the patient and the plan of care.  Supervising Physician:Torres Mckeon MD  [facility]

## 2019-09-04 NOTE — NURSING NOTE
Medicare Wellness Visit  Health Risk Assessment           Health Risk Assessment / Review of Systems     Constitutional: Any fevers or night sweats?  YES - known, have been experiencing hot flashes, side effect to receiving estrogen injections every 6 months.    Eyes:  Vision problems   No     Hearing Do you feel you have hearing loss?  No     Cardiovascular: Any chest pain, fast or irregular heart beat, calf pain with walking?     No           Respiratory:   Any breathing problems or cough?    YES - chronic, shortness of breath with minimal physical activity.    Gastrointestinal: Any stomach or stool problems?   No      Genitourinary: Do you have difficulty controlling urination?    YES - wears Depends for urinary incontinence. Denies dysuria or further concerns/issues.    Muscles and Joints: Any joint stiffness or soreness?    YES - c/o chronic pain. Left ankle fracture occurred x 10 years ago, experience pain once a day. Take Tylenol and Advil when needed, has been effective. Along with use of positive energy thinking.    Skin: Any concerning lesions or moles?   No     Nervous System: Any loss of strength or feeling, numbness or tingling, shaking, dizziness, or headache?   YES - c/o generalized body loss of strength. Uses walker or wheelchair to get around the house. Unable to ambulate by self without assistive devices.    Mental Health: Any depression, anxiety or problems sleeping?    No- taking Prozac with good effectiveness, stable and not currently an issue.    Cognition: Do you have any problems with your memory?  No            Medical Care     What other specialists or organizations are involved in your medical care?  Psychiatrist, Dr Jeord Bentley, [facility]- Dr Reggie Singh  Patient Care Team       Relationship Specialty Notifications Start End    Torres Mckeon MD PCP - General Family Practice  10/21/14     Phone: [phone] Fax: [phone]         [facility]  [id number]    Lyndsey Winkler    12/10/12     [facility]  [phone]          Have you been to the ER or overnight in the hospital in the last year?   YES - 4/2019 for generalized weakness, Kidney stone and UTI.         Social History / Home Safety       Marital Status:  Who lives in your household? Wife,Luz, 2 sons and 1 daughter. One of his son is away at college.    Do you feel threatened or controlled by a partner, ex-partner or anyone in your life? No     Has anyone hurt you physically, for example by pushing, hitting, slapping or kicking you   or forcing you to have sex? No          Does your home have any of the following safety concerns; loose rugs in the hallway,  bathrooms with no grab bars by the tub or toilet, stairs with no handrails or poorly lit areas?  No     Do you need help with dressing yourself, bathing, eating or getting around your home?   YES - wife and son    Do you need help with the phone, transportation, shopping, preparing meals, housework, laundry, medications or managing money? YES - wife and son      Risk Behaviors and Healthy Habits     History   Smoking Status     Former Smoker   Smokeless Tobacco     Never Used     Comment: 1978 quit     How many servings of fruits and vegetables do you eat a day? 2-3 servings of fruits and vegetables a day.    Exercise: None No exercise- states unable.    Do you frequently drive without a seatbelt? No     Do you use tobacco?  No     Do you use any other drugs? No         Do you use alcohol?Yes  Number of drinks per day - 1.5 beer every other week, 1 glass of wine once a month.  Number of drinking days a week - socially only      Frailty Assessment            Have you lost 10 or more pounds unintentionally in the previous year? No     How difficult is walking from one room to the other on the same level?moderately. Severe difficulty without assistive devices, with assist little to no difficulty.       Is it difficult  to lift or carry something as heavy as 10 pounds?severely      Compared with most (men/women) your age, would you say  that you are more active, less active, or about the same? less      Timed up and go test- unable to perform and complete. Unsteady on feet, use of wheelchair for visit.  FALL RISK ASSESSMENT 9/4/2019 2/23/2018   Fallen 2 or more times in the past year? No No   Any fall with injury in the past year? No No         Advance Directives:   Discussed with patient and family as appropriate. Has patient  completed advance directives and/or a living will? Yes- has asked to review completed copy of Advance HealthCare Directive from 2013 that is on file at [facility]. Would like to addend and make changes. Would like to be resuscitated but no intubation. Printed copy for son-Isrrael and patient for review. Also given blank copy to complete with accurate information.  Reno Khanna RN

## 2019-09-04 NOTE — PATIENT INSTRUCTIONS
PERSONAL PREVENTIVE SERVICES PLAN - SERVICES     Review these tests with your medical staff then decide which ones you want and take this page home for your reference      SCREENING TESTS     Description   Year of Last Screening   Recommended Today?   Heart disease screening blood tests    Cholesterol level Reducing cholesterol can reduce your risk of heart attacks by 25%.  Screening is recommended yearly if you are at risk of heart disease otherwise every 4-5 years 10/7/13 Yes; Recommended    Diabetes screening tests    Hemoglobin A1c blood test   Finding and treating diabetes early can reduce complications.  Screening recommended/covered yearly if you have high blood pressure, high cholesterol, obesity (BMI >30), or a history of high blood glucose tests; or 2 of the following: family history of diabetes, overweight (BMI >25 but <30), age 65 years or older, and a history of diabetes of pregnancy or gave birth to baby weighing more than 9 lbs. 6/3/2019  Glucose normal Yes; Recommended    Hepatitis B screening Finding hepatitis B early can reduce complications.  Screening is recommended for persons with selected risk factors.  No: is not indicated today.   Hepatitis C screening Finding hepatitis C early can reduce complications.  Screening is recommended for all persons born from 1945 through 1965 and for those with selected other risk factors.   No: is not indicated today.   HIV screening Finding HIV early can reduce complications.  Screening is recommended for persons with risk factors for HIV infection.  No: is not indicated today.   Glaucoma screening Early detection of glaucoma can prevent blindness.   Please talk to your eye doctor about this.       SCREENING TESTS     Description   Year of Last Screening   Recommended Today?   Colorectal cancer screening    Fecal occult blood test     Screening colonoscopy Screening for colon cancer has been shown to reduce death from colon cancer by 25-30%. Screening  recommended to start at 50 years and continuing until age 75 years.    No: is not indicated today.   Breast Cancer Screening (women)    Mammogram Mammogram screening for breast cancer has been shown to reduce the risk of dying from breast cancer and prolong life. Screening is recommended every 1-2 years for women aged 50 to 74 years.   No: is not indicated today.   Cervical Cancer screening (women)    Pap Cervical pap smears can reduce cervical cancer. Screening is recommended annually if high risk (history of abnormal pap smears) otherwise every 2-3 years, stop screening at 65 years of age if history of normal paps.  No: is not indicated today.   Screening for Osteoporosis:  Bone mass measurements (women)    Dexa Scan Screening and treating Osteoporosis can reduce the risk of hip and spine fractures. Screening is recommended in women 65 years or older and in women and men at risk of osteoporosis.  No: is not indicated today.   Screening for Lung Cancer     Low-dose CT scanning Screening can reduce mortality in persons aged 55-80 who have smoked at least 30 pack-years and who are either still smoking or have quit in the past 15 years.  No: is not indicated today.   Abdominal Aortic Aneurysm (AAA) screening    Ultrasound (US)   An aneurysm treated before rupture is very safe -a ruptured aneurysm can be fatal.  Screening  by US for AAA is limited to patients who meet one of the following criteria:    Men who are 65-75 years old and have smoked more than 100 cigarettes in their lifetime    Anyone with a family history of abdominal aortic aneurysm  No: is not indicated today.     Here are your recommended immunizations.  Take this home for your reference.                                                    IMMUNIZATIONS Description Recommend today?     Influenza (Flu shot) Prevents flu; should get every year Yes; Recommended   PCV 13 Pneumonia vaccination; you get it once No: is not indicated today.   PPSV 23 Second  pneumonia vaccination; usually get it 1 year after PCV 13 No: is not indicated today.   Zoster (Shingles) Prevents shingles; you get it once  (Check with Part D insurance for coverage, must receive at a pharmacy, not clinic) Yes; Recommended    Tetanus Prevents tetanus; once every 10 years Yes; Recommended      Hepatitis B  If you have any of the following risk factors you should be immunized for hepatitis B: severe kidney disease, people who live in the same house as a carrier of Hepatitis B virus, people who live in  institutions (e.g. nursing homes or group homes), homosexual men, patients with hemophilia who received Factor VIII or IX concentrates, abusers of illicit injectable drugs No: is not indicated today.      PATIENT INSTRUCTIONS    Yearly exam:     See your health care provider every year in order to review changes in your health, review medicines that you take, and discuss preventive care needs such as immunizations and cancer screening.    Get a flu shot each year.     Advance Directives:    If you have not done so, you are encouraged to complete advance directives and/or a living will.   More information about advance directives can be found at: http://www.[place]med.org/advocacy/Key-Issues/Advance-Directives    Nutrition:     Eat at least 5 servings of fruits and vegetables each day.     Eat whole-grain bread, whole-wheat pasta and brown rice instead of white grains and rice.     Talk to your doctor about Calcium and Vitamin D.     Lifestyle:    Exercise for at least 150 minutes a week (30 minutes a day, 5 days a week). This will help you control your weight and prevent disease.     Limit alcohol to one drink per day.     If you smoke, try to quit - your doctor will be happy to help.     Wear sunscreen to prevent skin cancer.     See your dentist every six months for an exam and cleaning.     See your eye doctor every 1 to 2 years to screen for conditions such as glaucoma, macular degeneration and  cataracts.                              Personalized Prevention Plan  You are due for the preventive services outlined below.  Your care team is available to assist you in scheduling these services.  If you have already completed any of these items, please share that information with your care team to update in your medical record.  Health Maintenance Due   Topic Date Due     Discuss Advance Care Planning  11/20/1933     Depression Action Plan  11/20/1933     Zoster (Shingles) Vaccine (1 of 2) 11/20/1983     Annual Wellness Visit  05/14/2016     Diptheria Tetanus Pertussis (DTAP/TDAP/TD) Vaccine (3 - Td) 08/01/2017     Depression Assessment  08/31/2017     FALL RISK ASSESSMENT  02/20/2019     Flu Vaccine (1) 09/01/2019     Preventive Health Recommendations  See your health care provider every year to    Review health changes.     Discuss preventive care.      Review your medicines if your doctor has prescribed any.    Talk with your health care provider about whether you should have a test to screen for prostate cancer (PSA).    Every 3 years, have a diabetes test (fasting glucose). If you are at risk for diabetes, you should have this test more often.    Every 5 years, have a cholesterol test. Have this test more often if you are at risk for high cholesterol or heart disease.     Every 10 years, have a colonoscopy. Or, have a yearly FIT test (stool test). These exams will check for colon cancer.    Talk to with your health care provider about screening for Abdominal Aortic Aneurysm if you have a family history of AAA or have a history of smoking.    Shots:     Get a flu shot each year.     Get a tetanus shot every 10 years.     Talk to your doctor about your pneumonia vaccines. There are now two you should receive - Pneumovax (PPSV 23) and Prevnar (PCV 13).    Talk to your pharmacist about a shingles vaccine.     Talk to your doctor about the hepatitis B vaccine.    Nutrition:     Eat at least 5 servings of fruits  and vegetables each day.     Eat whole-grain bread, whole-wheat pasta and brown rice instead of white grains and rice.     Get adequate Calcium and Vitamin D.     Lifestyle    Exercise for at least 150 minutes a week (30 minutes a day, 5 days a week). This will help you control your weight and prevent disease.     Limit alcohol to one drink per day.     No smoking.     Wear sunscreen to prevent skin cancer.     See your dentist every six months for an exam and cleaning.     See your eye doctor every 1 to 2 years to screen for conditions such as glaucoma, macular degeneration and cataracts.    Personalized Prevention Plan  You are due for the preventive services outlined below.  Your care team is available to assist you in scheduling these services.  If you have already completed any of these items, please share that information with your care team to update in your medical record.  Health Maintenance Due   Topic Date Due     Discuss Advance Care Planning  11/20/1933     Depression Action Plan  11/20/1933     Zoster (Shingles) Vaccine (1 of 2) 11/20/1983     Annual Wellness Visit  05/14/2016     Diptheria Tetanus Pertussis (DTAP/TDAP/TD) Vaccine (3 - Td) 08/01/2017     Depression Assessment  08/31/2017     FALL RISK ASSESSMENT  02/20/2019     Flu Vaccine (1) 09/01/2019

## 2019-09-28 ENCOUNTER — HEALTH MAINTENANCE LETTER (OUTPATIENT)
Age: 84
End: 2019-09-28

## 2019-10-09 ENCOUNTER — TELEPHONE (OUTPATIENT)
Dept: FAMILY MEDICINE | Facility: CLINIC | Age: 84
End: 2019-10-09

## 2019-10-09 NOTE — TELEPHONE ENCOUNTER
[facility] phone call message- medication clarification/question:    Full Medication Name:    Strength:     Have you contacted your pharmacy about this refill request?     If  Yes,  which pharmacy?    When did you contact the pharmacy?    Additional comments/concerns from call to pharmacy:    Reason for call to clinic: Patient is requesting his Blood pressure medication back on his list and needs it sent to the pharmacy.  He states the doctor removed it from his list but now he wants it back on the list and an Rx sent to the pharmacy. Told him it could take up to two business days for a response. Please call and advise.       Pharmacy confirmed as    [id number] - [place], [place] - [address] AT [facility]: Yes    OK to leave a message on voice mail?     Primary language: English      needed? No    Call taken on October 9, 2019 at 9:55 AM by Doug Carreon

## 2019-10-09 NOTE — TELEPHONE ENCOUNTER
"Called patient to discuss concerns. Patient stated his blood pressure has been running \"extremely high\". Patient unable to provide bp readings, cannot remember per patient. Advised patient would require an appointment if unable to provide us the blood pressure readings. Patient then stated \"we'll be in touch\". Patient did note having a difficult time hearing me on the phone, attempted to talk slower and louder but patient was still having a difficult time hearing me.  Advised will still route to PCP.   "

## 2019-10-09 NOTE — TELEPHONE ENCOUNTER
"He is on metoprolol and losartan for blood pressure.   If requesting additional, should come in with blood pressure log from home.   WR     Called patient to advise of the following. Advised patient would need office visit, patient then asked \"what about adding the medication because what I have is not working\". Advised would need office visit first r/t being on two bp medications already. Patient then hung up the phone. Porfirio ADDISON  "

## 2019-11-11 ENCOUNTER — OFFICE VISIT (OUTPATIENT)
Dept: FAMILY MEDICINE | Facility: CLINIC | Age: 84
End: 2019-11-11
Payer: COMMERCIAL

## 2019-11-11 VITALS
DIASTOLIC BLOOD PRESSURE: 64 MMHG | TEMPERATURE: 97.4 F | RESPIRATION RATE: 24 BRPM | SYSTOLIC BLOOD PRESSURE: 98 MMHG | OXYGEN SATURATION: 93 % | HEART RATE: 74 BPM

## 2019-11-11 DIAGNOSIS — R30.0 DYSURIA: Primary | ICD-10-CM

## 2019-11-11 DIAGNOSIS — N30.00 ACUTE CYSTITIS WITHOUT HEMATURIA: ICD-10-CM

## 2019-11-11 LAB
BACTERIA: ABNORMAL
BILIRUBIN UR: NEGATIVE MG/DL
BLOOD UR: ABNORMAL MG/DL
CASTS: ABNORMAL /LPF
CLARITY, URINE: CLEAR
COLOR UR: YELLOW
CRYSTAL URINE: ABNORMAL /LPF
EPITHELIAL CELLS UR: ABNORMAL /LPF (ref 0–2)
GLUCOSE URINE: NEGATIVE
KETONES UR QL: NEGATIVE MG/DL
LEUKOCYTE ESTERASE UR: ABNORMAL
MUCOUS URINE: ABNORMAL LPF
NITRITE UR QL STRIP: POSITIVE MG/DL
PH UR STRIP: 6.5 [PH] (ref 4.5–8)
PROTEIN UR: NEGATIVE MG/DL
RBC URINE: ABNORMAL /HPF
SP GR UR STRIP: 1.02 (ref 1–1.03)
UROBILINOGEN UR STRIP-ACNC: ABNORMAL E.U./DL
WBC URINE: ABNORMAL /HPF

## 2019-11-11 RX ORDER — SULFAMETHOXAZOLE/TRIMETHOPRIM 800-160 MG
1 TABLET ORAL 2 TIMES DAILY
Qty: 14 TABLET | Refills: 0 | Status: SHIPPED | OUTPATIENT
Start: 2019-11-11 | End: 2019-11-18

## 2019-11-11 NOTE — PROGRESS NOTES
HPI:       Felix Bangura is a 85 year old male with a past medial history of failure to thrive and diastolic heart failure who presents for the following concern:    Concern for a UTI   - Patient brought in by his son today with concern for UTI   - Patient has been increasingly confused over the weekend   - Wife states that his urine is very strong and malodorous and dark in color   - Patient denies any issues and states he feels fine   - Denies any abdominal pain, new back pain, fevers, chills, nausea or vomiting   - Has not been on antibiotics recently   - Denies any blood in his urine   - Denies frequency, urgency or dysuria, but is incontinent at baseline and difficult to assess          PMHX:     Patient Active Problem List   Diagnosis     Health Care Home     Abdominal hernia Incisional Epigastric     Major depressive disorder, recurrent episode (H)     Complete rupture of rotator cuff     Diastolic dysfunction     Dysthymia     Hypercholesterolemia     Low back pain     Nevus, non-neoplastic     Obstructive sleep apnea     Osteoarthrosis     Seborrheic keratosis     Insomnia due to medical condition     Adjustment disorder with anxious mood     Aortic valve stenosis     Failure to thrive in adult     Malignant neoplasm of prostate (H)     Generalized weakness     Renal calculus, right     Hypovitaminosis D       Current Outpatient Medications   Medication Sig Dispense Refill     bicalutamide (CASODEX) 50 MG tablet TK 1 T PO QD  0     Disposable Gloves (SYNTHETIC VINYL EXAM GLOVES) MISC 1 Package 5 times daily 100 each 11     FLUoxetine (PROZAC) 40 MG capsule Take 1 capsule (40 mg) by mouth daily 90 capsule 4     losartan (COZAAR) 50 MG tablet Take 1 tablet (50 mg) by mouth daily 90 tablet 3     methylphenidate (CONCERTA) 27 MG CR tablet TK 1 T PO QD  0     metoprolol succinate (TOPROL-XL) 25 MG 24 hr tablet Take 1 tablet (25 mg) by mouth daily 90 tablet 3     nitroGLYcerin (NITROSTAT) 0.4 MG  sublingual tablet Place 1 tablet (0.4 mg) under the tongue every 5 minutes as needed for chest pain 25 tablet 11     order for DME Equipment being ordered: Saline Enema, 4.5 fl oz bottle size 1 Device 11     order for DME Equipment being ordered: gloves for cares  6 times per day (12 gloves)  Dispense 400 per month  Refill 11 400 Device 11     tamsulosin (FLOMAX) 0.4 MG capsule Take 1 tab by mouth daily. (pharmacy - please fill 90 day supply) 90 capsule 3     traZODone (DESYREL) 50 MG tablet Take 0.5 tablets (25 mg) by mouth At Bedtime 45 tablet 3     vitamin D3 (CHOLECALCIFEROL) 2000 units (50 mcg) tablet Take 1 tablet (2,000 Units) by mouth daily 100 tablet 3       Social History     Tobacco Use     Smoking status: Former Smoker     Smokeless tobacco: Never Used     Tobacco comment: 1978 quit   Substance Use Topics     Alcohol use: Yes     Drug use: No          Allergies   Allergen Reactions     Aspirin      Legs feel funny, pain in knee     Clam Shell      Dust Mites      Office Visit on 11/11/2019   Component Date Value Ref Range Status     Specific Gravity Urine 11/11/2019 1.025  1.005 - 1.030 Final     pH Urine 11/11/2019 6.5  4.5 - 8.0 Final     Leukocyte Esterase UR 11/11/2019 3+* NEGATIVE Final     Nitrite Urine 11/11/2019 Positive* NEGATIVE mg/dL Final     Protein UR 11/11/2019 Negative  NEGATIVE mg/dL Final     Glucose Urine 11/11/2019 Negative  NEGATIVE Final     Ketones Urine 11/11/2019 Negative  NEGATIVE mg/dL Final     Urobilinogen mg/dL 11/11/2019 1.0 E.U./dL* 0.2 E.U./dL E.U./dL Final     Bilirubin UR 11/11/2019 Negative  NEGATIVE mg/dL Final     Blood UR 11/11/2019 Trace-intact* NEGATIVE mg/dL Final     Color Urine 11/11/2019 Yellow   Final     Clarity, urine 11/11/2019 Clear   Final     WBC Urine 11/11/2019 5-10  <5 /hpf Final     RBC Urine 11/11/2019 2-5  <5 /hpf Final     Epithelial Cells UR 11/11/2019 10-25  0 - 2 /lpf Final     Mucous Urine 11/11/2019 None  NONE lpf Final     Casts Urine  11/11/2019 None  NONE /lpf Final     Crystal Urine 11/11/2019 Calcium Oxalate* NONE /lpf Final     Bacteria Wet Prep 11/11/2019 Many  None Final     Culture 11/11/2019 SEE RESULTS BELOW*  Final       Family History   Problem Relation Age of Onset     EYE* Mother         Glaucoma     Cancer Father         skin     Cardiovascular Sister             Review of Systems:       10 point review of systems negative except for noted in HPI             Physical Exam:     Vitals:    11/11/19 1621 11/11/19 1653   BP: (!) 89/59 98/64   Pulse: 74    Resp: 24    Temp: 97.4  F (36.3  C)    TempSrc: Oral    SpO2: 93%      There is no height or weight on file to calculate BMI.    Exam:  Constitutional: elderly frail male, pale, alert and no distress  Cardiovascular: Regular rate and rhythm. No murmurs, clicks gallops or rub  Respiratory: Lungs clear to auscultation. No wheezing or crackles present   Abdomen:  Abdomen soft, non-tender. BS normal. No masses, organomegaly  Back: No CVA tenderness   Psychiatric: mentation appears normal and affect normal/bright      Assessment and Plan     1. Acute cystitis without hematuria  UA consistent with acute cystitis. Given increasing confusion and malodorous urine, will treat. UA noting oxylate crystals but no other nephrolithiasis symptoms. Soft BP but on recheck within normal range for patient. Patient vitally stable, so reasonable to trial outpatient treatment, however he is very frail and given precautions about indications to return to clinic or present to the ED including fevers, chills, inability to take PO, increasing confusion despite antibiotics, abdominal pain.   - UC pending   - sulfamethoxazole-trimethoprim (BACTRIM DS/SEPTRA DS) 800-160 MG tablet; Take 1 tablet by mouth 2 times daily for 7 days  Dispense: 14 tablet; Refill: 0  - Hold amlodipine (supposed to have been discontinued at last appointment, but continued due to high BPs at home) and check your BP daily for the next week.  Given range of normal BPs and instructed to call if outside this range for discussion in management of antihypertensives.     Options for treatment and follow-up care were reviewed with the patient and/or guardian. Felix Bangura and/or guardian engaged in the decision making process and verbalized understanding of the options discussed and agreed with the final plan.    Diana Bañuelos DO (PGY3)   Pager #[phone]    Precepted today with: Selvin Elias MD

## 2019-11-11 NOTE — PATIENT INSTRUCTIONS
- Take your blood pressure daily for 1 week   - If your blood pressures are higher than 140/90 or less than 100/70 please call our clinic to let us know and adjust medications as needed   - Please stop taking your amlodipine

## 2019-11-14 ENCOUNTER — TELEPHONE (OUTPATIENT)
Dept: FAMILY MEDICINE | Facility: CLINIC | Age: 84
End: 2019-11-14

## 2019-11-14 NOTE — TELEPHONE ENCOUNTER
[facility] phone call message- general phone call:    Reason for call: Caller wanted to follow up on the result of the testing for UTI a few days ago. Caller state that patient has not improved since treatment. Please call and advise.     Return call needed: Yes    OK to leave a message on voice mail? Yes    Primary language: English      needed? No    Call taken on November 14, 2019 at 11:47 AM by Last Clements

## 2019-11-14 NOTE — TELEPHONE ENCOUNTER
Called Isrrael to discuss. Isrrael stated patient had a fall last night and a fall this morning, does not think current medication is fighting infection. No other symptoms, previous symptoms have not resolved or improved but have not worsened either.  No fever, SOB, chills, chest pain, palpitations. Preliminary result for culture available, will route to house physician in afternoon as this was the physician that saw patient in clinic. Isrrael verbalized understanding. Porfirio ADDISON

## 2019-11-14 NOTE — TELEPHONE ENCOUNTER
called and advised patient go to ED. Called patient and Isrrael to discuss. Patient and Isrrael declined ED at this time r/t frequent history of falls and not much treatment done when they do go in. Advised it is against medical advice, requested if symptoms worsen or patient falls again to report to ED. Isrrael and patient verbalized understanding. Porfirio ADDISON

## 2019-11-14 NOTE — TELEPHONE ENCOUNTER
Patient is elderly and frail. If any question about symptoms not improving, especially with recent fall patient needs to present to the ED. RN called to instruct patient to present to the ED.     Diana Bañuelos DO (PGY3)  [facility] Resident  Pager: [phone]

## 2019-11-16 LAB — CULTURE: ABNORMAL

## 2019-11-18 ENCOUNTER — TELEPHONE (OUTPATIENT)
Dept: FAMILY MEDICINE | Facility: CLINIC | Age: 84
End: 2019-11-18

## 2019-11-18 DIAGNOSIS — N30.00 ACUTE CYSTITIS WITHOUT HEMATURIA: Primary | ICD-10-CM

## 2019-11-18 RX ORDER — AMOXICILLIN 500 MG/1
500 CAPSULE ORAL 2 TIMES DAILY
Status: CANCELLED | OUTPATIENT
Start: 2019-11-18

## 2019-11-18 RX ORDER — AMOXICILLIN 875 MG
875 TABLET ORAL 2 TIMES DAILY
Qty: 10 TABLET | Refills: 0 | Status: SHIPPED | OUTPATIENT
Start: 2019-11-18 | End: 2019-11-23

## 2019-11-18 NOTE — TELEPHONE ENCOUNTER
Called back Felix regarding the results of his UC. Patient called the clinic on 11/14 and was instructed to go to the ED given multiple falls and symptoms now resolved with current therapy. Patient did not go to the ED as instructed. Again called today regarding results of UC. Per patient's wife he has not been doing well and is nauseous, vomiting and is having significant abdominal pain. Discussed results with patient and wife and recommended again to present to the ED given he will not be able to tolerate PO antibiotics and worsening symptoms.     Diana Bañuelos DO (PGY3)  [facility] Resident  Pager: [phone]

## 2019-11-18 NOTE — TELEPHONE ENCOUNTER
[facility] phone call message- patient requesting results:    Test: Lab    Date of test: 11/11/2019    Additional Comments: Patient wants to know results of urine testing.    OK to leave a message on voice mail? Yes    Primary language: English      needed? No    Call taken on November 18, 2019 at 10:51 AM by Last Clements

## 2019-11-19 NOTE — PROGRESS NOTES
Preceptor Attestation:   Patient seen, evaluated and discussed with the resident. I have verified the content of the note, which accurately reflects my assessment of the patient and the plan of care.    Supervising Physician:Selvin Elias MD    [facility]

## 2019-11-22 ENCOUNTER — TELEPHONE (OUTPATIENT)
Dept: FAMILY MEDICINE | Facility: CLINIC | Age: 84
End: 2019-11-22

## 2019-11-22 NOTE — TELEPHONE ENCOUNTER
Called patient to discuss 's recommendation. Advised to be seen in ugent care or ED per 's recommendation. Patient does not want to go to either. Patient's wife then spoke with me stating she believes patient has viral illness and that is why antibiotics are not working. Advised it is likely r/t not being able to complete a course of antibiotics and needing IV antibiotic. Patient's wife then stated patient is disoriented and speaking slowly since beginning of UTI. Advised importance of patient being seen today, she stated they will go to [facility] r/t recent visit to [facility]. Advised of our on-call system with questions or concerns if needing to call after hours. She verbalized understanding. Porfirio ADDISON

## 2019-11-22 NOTE — TELEPHONE ENCOUNTER
Called patient to discuss concerns. Patient has tried oral antibiotics and c/o dry heaves, sick stomach, itching, loss of appetite, insomnia, and overall disorientation. Patient no longer taking the antibiotics. ED provided oral antibiotics for patient and patient did not tolerate. No UTI symptoms, but patient endorses no symptoms upon initial diagnosis as well. Will route to house physician for recommendations r/t no clinic appointments available today. Will route to afternoon covering physician at noon today. Porfirio ADDISON

## 2019-11-22 NOTE — TELEPHONE ENCOUNTER
[facility] phone call message- medication clarification/question:    Full Medication Name: amoxicillin (AMOXIL)   Dose: 875 MG tablet     Question: Patient states he was seen for a UTI and was prescribed the medication listed above. He states he has had bad symptoms with this medication and stopped taking the medication. Patient would like a call back to see what the provider wants to do. Please call and advise. Notified it may take 2 days for a response and patient states that's to long to wait and he declined ofv.       Pharmacy confirmed as    [id number] - [place], [place] - [address] AT [facility]  : Yes    OK to leave a message on voice mail? Yes    Primary language: English      needed? No    Call taken on November 22, 2019 at 11:35 AM by Luz Pillai

## 2019-12-24 ENCOUNTER — MEDICAL CORRESPONDENCE (OUTPATIENT)
Dept: HEALTH INFORMATION MANAGEMENT | Facility: CLINIC | Age: 84
End: 2019-12-24

## 2020-01-01 ENCOUNTER — MEDICAL CORRESPONDENCE (OUTPATIENT)
Dept: HEALTH INFORMATION MANAGEMENT | Facility: CLINIC | Age: 85
End: 2020-01-01

## 2020-01-01 ENCOUNTER — TELEPHONE (OUTPATIENT)
Dept: FAMILY MEDICINE | Facility: CLINIC | Age: 85
End: 2020-01-01

## 2020-01-01 ENCOUNTER — VIRTUAL VISIT (OUTPATIENT)
Dept: FAMILY MEDICINE | Facility: CLINIC | Age: 85
End: 2020-01-01
Payer: COMMERCIAL

## 2020-01-01 ENCOUNTER — DOCUMENTATION ONLY (OUTPATIENT)
Dept: FAMILY MEDICINE | Facility: CLINIC | Age: 85
End: 2020-01-01

## 2020-01-01 ENCOUNTER — OFFICE VISIT (OUTPATIENT)
Dept: FAMILY MEDICINE | Facility: CLINIC | Age: 85
End: 2020-01-01
Payer: COMMERCIAL

## 2020-01-01 VITALS — TEMPERATURE: 90 F

## 2020-01-01 VITALS — HEART RATE: 72 BPM | TEMPERATURE: 91 F | DIASTOLIC BLOOD PRESSURE: 74 MMHG | SYSTOLIC BLOOD PRESSURE: 114 MMHG

## 2020-01-01 DIAGNOSIS — R62.7 FAILURE TO THRIVE IN ADULT: ICD-10-CM

## 2020-01-01 DIAGNOSIS — N39.0 URINARY TRACT INFECTION WITHOUT HEMATURIA, SITE UNSPECIFIED: Primary | ICD-10-CM

## 2020-01-01 DIAGNOSIS — G47.01 INSOMNIA DUE TO MEDICAL CONDITION: Primary | ICD-10-CM

## 2020-01-01 DIAGNOSIS — G47.00 INSOMNIA, UNSPECIFIED TYPE: ICD-10-CM

## 2020-01-01 DIAGNOSIS — F33.2 SEVERE EPISODE OF RECURRENT MAJOR DEPRESSIVE DISORDER, WITHOUT PSYCHOTIC FEATURES (H): ICD-10-CM

## 2020-01-01 DIAGNOSIS — Z74.01 BEDRIDDEN: ICD-10-CM

## 2020-01-01 DIAGNOSIS — F32.A DEPRESSION, UNSPECIFIED DEPRESSION TYPE: ICD-10-CM

## 2020-01-01 DIAGNOSIS — U07.1 CLINICAL DIAGNOSIS OF COVID-19: Primary | ICD-10-CM

## 2020-01-01 DIAGNOSIS — N13.9 URINARY (TRACT) OBSTRUCTION: ICD-10-CM

## 2020-01-01 DIAGNOSIS — U07.1 CLINICAL DIAGNOSIS OF COVID-19: ICD-10-CM

## 2020-01-01 DIAGNOSIS — C61 MALIGNANT NEOPLASM OF PROSTATE (H): Primary | ICD-10-CM

## 2020-01-01 PROCEDURE — 99213 OFFICE O/P EST LOW 20 MIN: CPT | Mod: 95 | Performed by: FAMILY MEDICINE

## 2020-01-01 PROCEDURE — 99207 PR NO BILLABLE SERVICE THIS VISIT: CPT | Mod: 95 | Performed by: STUDENT IN AN ORGANIZED HEALTH CARE EDUCATION/TRAINING PROGRAM

## 2020-01-01 RX ORDER — CEPHALEXIN 500 MG/1
500 CAPSULE ORAL 2 TIMES DAILY
Qty: 10 CAPSULE | Refills: 0 | Status: SHIPPED | OUTPATIENT
Start: 2020-01-01 | End: 2020-01-01

## 2020-01-01 RX ORDER — TRAZODONE HYDROCHLORIDE 50 MG/1
25 TABLET, FILM COATED ORAL AT BEDTIME
Qty: 45 TABLET | Refills: 4 | Status: SHIPPED | OUTPATIENT
Start: 2020-01-01 | End: 2021-01-01

## 2020-01-02 DIAGNOSIS — I10 ESSENTIAL HYPERTENSION: ICD-10-CM

## 2020-01-02 RX ORDER — METOPROLOL SUCCINATE 25 MG/1
25 TABLET, EXTENDED RELEASE ORAL DAILY
Qty: 90 TABLET | Refills: 4 | Status: SHIPPED | OUTPATIENT
Start: 2020-01-02 | End: 2021-01-01

## 2020-02-16 ENCOUNTER — MEDICAL CORRESPONDENCE (OUTPATIENT)
Dept: HEALTH INFORMATION MANAGEMENT | Facility: CLINIC | Age: 85
End: 2020-02-16

## 2020-03-16 ENCOUNTER — MEDICAL CORRESPONDENCE (OUTPATIENT)
Dept: HEALTH INFORMATION MANAGEMENT | Facility: CLINIC | Age: 85
End: 2020-03-16

## 2020-06-02 PROBLEM — U07.1 CLINICAL DIAGNOSIS OF COVID-19: Status: ACTIVE | Noted: 2020-01-01

## 2020-06-02 NOTE — PATIENT INSTRUCTIONS
As you recover from COVID-19    Patients who have symptoms (cough, fever, or shortness of breath), need to isolate for 10 days from when symptoms started AND 72 hours after fever resolves (without fever reducing medications) AND improvement of respiratory symptoms (whichever is longer).    During this time:    Isolate yourself at home (in own room/own bathroom if possible)    Do not allow any visitors    Do not go to work or school    Do not go to [employer],  centers, shopping, or other public places    Do not shake hands    Avoid close and intimate contact with others (hugging, kissing)    Follow CDC recommendations for household cleaning of frequently touched services    After the initial 10 days, continue to isolate yourself from household members as much as possible. To continue decrease the risk of community spread and exposure, you and any members of your household should limit activities in public for 14 days after starting home isolation.     You can reference the following CDC link for helpful home isolation/care tips:  https://www.cdc.gov/coronavirus/2019-ncov/downloads/10Things.pdf    Protect Others:    Cover your mouth and nose with a mask, disposable tissue or wash cloth to avoid spreading germs.    Wash your hands and face often. Use soap and water    Call Back If: Breathing difficulty develops or you become worse.      For more information about COVID19 and options for caring for yourself at home, please visit the CDC website at https://www.cdc.gov/coronavirus/2019-ncov/about/steps-when-sick.html  For more options for care at [facility], please visit our website at [email or web address]    For information about [facility] COVID-19 Clinical Trials, please visit [email or web address]    For more information, please use the [place] Department of Health COVID-19 Website:  [email or web address]/coronavirus/index.html  [place] Department of Health ([facility]) COVID-19 Hotlines (Interpreters  available):      Health questions: Phone Number: [phone] or [phone] and Hours: 7 a.m. to 7 p.m.    Schools and  questions: Phone Number: [phone] or [phone] and Hours 7 a.m. to 7 p.m.

## 2020-06-02 NOTE — PROGRESS NOTES
"Family Medicine Telephone Visit Note         Telephone Visit Consent   Patient was verbally read the following and verbal consent was obtained.    \"Telephone visits are billed at different rates depending on your insurance coverage. During this emergency period, for some insurers they may be billed the same as an in-person visit.  Please reach out to your insurance provider with any questions.  If during the course of the call the physician/provider feels a telephone visit is not appropriate, you will not be charged for this service.\"    Name person giving consent:  Patient   Date verbal consent given:  6/2/2020  Time verbal consent given:  2:20 PM       Patient presents with:  Diabetes: Screening  Thyroid Problem: Screening  URI  Fever    Start time:1433    Body temp under the tongue is 90-93 F  Started 2 months ago  Worse at night, had chills.  Started after wife's sickness who had CV19 like symptoms in systems other than lungs, had severe headache and body aches for 2 weeks.    COVID19 Checklist    Symptoms that may appear 2-14 days after exposure to the virus:  Cough no   Shortness of breath or difficulty breathing no    Or at least two of the following:  Fever no  Chills yes attributes to estrogen  Repeated shaking with chills no  Muscle pain or aches no  Headache no to slight  Sore throat no  New loss of taste or smell no  Any other symptoms that are severe or concerning to you low body temp    Is clammy and appears pale to wife.    SUBJECTIVE:  An 86-year-old frail male being evaluated by telephone due to COVID-19 pandemic.  He started developing a low body temperature at night and in the mornings about 2 months ago following his wife's illness.  His wife had an illness that for all descriptions fits COVID-19 except for the respiratory symptoms.  He has been getting measured temperatures of 90-93 orally using a thermometer they purchased at [employer].  His history comes from both he and his wife.  His wife " says he is eating okay and sleeping okay.  Felix does not have any particular complaints other than feeling weak and as noted above in the COVID checklist.  He has not had any respiratory disturbance or respiratory distress.      The family is wondering if he should have chemistry testing and thyroid testing.      He is basically homebound.  It is a production for the family to get him out of the house.  I think he would be at more risk of leaving the home than he is to get tested, as he is not in distress at this time.  We talked about the option of having him tested for COVID and just observing at home.  They will continue to observe at home.  If he worsens, we will need to bring him to an Emergency Department for evaluation.  I have given him instructions for followup in the EDS and would like to have him check in with us in 1 week.      Wife and patient involved in medical discussion throughout the visit.         Current Outpatient Medications   Medication Sig Dispense Refill     bicalutamide (CASODEX) 50 MG tablet TK 1 T PO QD  0     Disposable Gloves (SYNTHETIC VINYL EXAM GLOVES) MISC 1 Package 5 times daily 100 each 11     FLUoxetine (PROZAC) 40 MG capsule Take 1 capsule (40 mg) by mouth daily 90 capsule 4     losartan (COZAAR) 50 MG tablet Take 1 tablet (50 mg) by mouth daily 90 tablet 3     methylphenidate (CONCERTA) 27 MG CR tablet TK 1 T PO QD  0     metoprolol succinate ER (TOPROL-XL) 25 MG 24 hr tablet Take 1 tablet (25 mg) by mouth daily 90 tablet 4     nitroGLYcerin (NITROSTAT) 0.4 MG sublingual tablet Place 1 tablet (0.4 mg) under the tongue every 5 minutes as needed for chest pain 25 tablet 11     order for DME Equipment being ordered: Saline Enema, 4.5 fl oz bottle size 1 Device 11     order for DME Equipment being ordered: gloves for cares  6 times per day (12 gloves)  Dispense 400 per month  Refill 11 400 Device 11     tamsulosin (FLOMAX) 0.4 MG capsule Take 1 tab by mouth daily. (pharmacy -  "please fill 90 day supply) 90 capsule 3     traZODone (DESYREL) 50 MG tablet Take 0.5 tablets (25 mg) by mouth At Bedtime 45 tablet 3     vitamin D3 (CHOLECALCIFEROL) 2000 units (50 mcg) tablet Take 1 tablet (2,000 Units) by mouth daily 100 tablet 3     Allergies   Allergen Reactions     Aspirin      Legs feel funny, pain in knee     Clam Shell      Dust Mites               Review of Systems:     Constitutional, HEENT, cardiovascular, pulmonary, gi and gu systems are negative, except as otherwise noted.         Physical Exam:     Temp (!) 90  F (32.2  C) (Oral)   Estimated body mass index is 24.94 kg/m  as calculated from the following:    Height as of 9/4/19: 1.727 m (5' 8\").    Weight as of 9/4/19: 74.4 kg (164 lb).    Exam:  Constitutional: alert and mild distress              Assessment and Plan       ICD-10-CM    1. Clinical diagnosis of COVID-19  U07.1        Refilled medications that would be required in the next 3 months.     After Visit Information:  Will print and mail AVS     Return in 1 week (on 6/9/2020) for Check COVID 19 .    Appointment end time: 2:51 PM  This is a telephone visit that took 18 minutes.      Clinician location:  [place], [place]                "

## 2020-06-05 NOTE — PROGRESS NOTES
Preceptor Attestation:    I discussed the patient with the resident. I have verified the content of the note, which accurately reflects my assessment of the patient and the plan of care.   Supervising Physician:  Pia Donald MD.

## 2020-06-05 NOTE — PROGRESS NOTES
"Family Medicine Telephone Visit Note  *[id number]             Telephone Visit Consent   Patient was verbally read the following and verbal consent was obtained.    \"Telephone visits are billed at different rates depending on your insurance coverage. During this emergency period, for some insurers they may be billed the same as an in-person visit.  Please reach out to your insurance provider with any questions.  If during the course of the call the physician/provider feels a telephone visit is not appropriate, you will not be charged for this service.\"    Name person giving consent:  Patient -pt's wife Kim is presented during visit  Date verbal consent given:  6/5/2020  Time verbal consent given:  2:58 PM    Chief Complaint   Patient presents with     RECHECK     f/u last visit on 6/2. condition worsening     Medication Reconciliation     complete            HPI   Patients name: Felix  Appointment start time:  3:07 PM      Concern: UTI   Description of the problem :  -Patient spoke with Dr. Mckeon on 6/2/2020 with concerns about possible COVID exposure and low body temperatures with some fatigue.  -Over the last day or so the wife has noticed the patient's urine has become darker in his diaper and is odorous.    -Felix has had a little extra fatigue associated with this.    -The patient denies any symptoms at this point in time, other than feeling generally unhappy but he states this is not new.     -Patient has had a couple of UTI's in the past and has required antibiotics for this.    -Wife states that she is doing okay handling the burden of caring for Felix.       Current Outpatient Medications   Medication Sig Dispense Refill     bicalutamide (CASODEX) 50 MG tablet TK 1 T PO QD  0     cephALEXin (KEFLEX) 500 MG capsule Take 1 capsule (500 mg) by mouth 2 times daily 10 capsule 0     Disposable Gloves (SYNTHETIC VINYL EXAM GLOVES) MISC 1 Package 5 times daily 100 each 11     FLUoxetine (PROZAC) 40 MG capsule Take " "1 capsule (40 mg) by mouth daily 90 capsule 4     losartan (COZAAR) 50 MG tablet Take 1 tablet (50 mg) by mouth daily 90 tablet 3     methylphenidate (CONCERTA) 27 MG CR tablet TK 1 T PO QD  0     metoprolol succinate ER (TOPROL-XL) 25 MG 24 hr tablet Take 1 tablet (25 mg) by mouth daily 90 tablet 4     nitroGLYcerin (NITROSTAT) 0.4 MG sublingual tablet Place 1 tablet (0.4 mg) under the tongue every 5 minutes as needed for chest pain 25 tablet 11     order for DME Equipment being ordered: Saline Enema, 4.5 fl oz bottle size 1 Device 11     order for DME Equipment being ordered: gloves for cares  6 times per day (12 gloves)  Dispense 400 per month  Refill 11 400 Device 11     tamsulosin (FLOMAX) 0.4 MG capsule Take 1 tab by mouth daily. (pharmacy - please fill 90 day supply) 90 capsule 3     traZODone (DESYREL) 50 MG tablet Take 0.5 tablets (25 mg) by mouth At Bedtime 45 tablet 3     vitamin D3 (CHOLECALCIFEROL) 2000 units (50 mcg) tablet Take 1 tablet (2,000 Units) by mouth daily 100 tablet 3     Allergies   Allergen Reactions     Aspirin      Legs feel funny, pain in knee     Clam Shell      Dust Mites             Review of Systems:     Constitutional, HEENT, cardiovascular, pulmonary, gi and gu systems are negative, except as otherwise noted.         Physical Exam:     Temp (!) 90  F (32.2  C) (Oral)   Estimated body mass index is 24.94 kg/m  as calculated from the following:    Height as of 9/4/19: 1.727 m (5' 8\").    Weight as of 9/4/19: 74.4 kg (164 lb).    Exam:  Constitutional: Patient alert, feels at his baseline, answers questions appropriately but is slightly hard to understand.    Psychiatric:  Patient states he feels unhappy with his illness.          Assessment and Plan     1. Urinary tract infection without hematuria, site unspecified  -Patient's wife thinks the patient is having a UTI, which he has had previously in the past.    -Wife reports darker urine and increased odor like when she had UTI's in " the past.    -Given his history, and the fact that collecting a sample is difficult given the COVID pandemic, we will give a short course of keflex to help with symptoms 500 mg BID x 5 days.  -Will follow up next week with Dr. Mckeon to ensure he continues to improve.    (F32.9) Depression, unspecified depression type  -Patient is feeling depressed with his recent illnesses's.    -Denies any thoughts of harming himself.  -Mostly stressed with his poor overall health, not actually deconditioning.    -On fluoxetine 40 mg with limited relief.    -Could consider adjusting medications if he continues to struggle with his mood.      (R62.7) Failure to thrive in adult  -On the phone visit family is concerned about Felix's difficulty getting around the house, his eating, and just keeping up with his daily cares.  -It sounds like there may be some failure to thrive in this patient.  -Discussed being seen and evaluated for this and possible home healthcare to help with cares, but wife is quick to deny wanting these services.  -May benefit from PT or OT in the future. Could also benefit from home cares if the family was open to this.  -Will continue to discuss at future visits.        Refilled medications that would be required in the next 3 months.     After Visit Information:  Patient declined AVS     No follow-ups on file.    Appointment end time: 3:27 PM  This is a telephone visit that took 20 minutes.    Clinician location:  [place]    Torres Rodríguez MD  I precepted today with Dr. Donald.

## 2020-06-09 NOTE — PATIENT INSTRUCTIONS
It is good to hear your UTI symptoms are gone.  I cannot explain your low body temperature, but it seems to be your new normal.  Recheck in one month

## 2020-06-09 NOTE — PROGRESS NOTES
"Family Medicine Telephone Visit Note               Telephone Visit Consent   Patient was verbally read the following and verbal consent was obtained.    \"Telephone visits are billed at different rates depending on your insurance coverage. During this emergency period, for some insurers they may be billed the same as an in-person visit.  Please reach out to your insurance provider with any questions.  If during the course of the call the physician/provider feels a telephone visit is not appropriate, you will not be charged for this service.\"    Name person giving consent:  Patient   Date verbal consent given:  6/9/2020  Time verbal consent given:  10:58 AM           Chief Complaint   Patient presents with     RECHECK     F/u: covid sx/UTI     Medication Reconciliation     Completed                HPI   Patients name: Felix  Appointment start time:  11:25 AM    TELEPHONE VISIT       SUBJECTIVE:  An 86-year-old male is being managed by telephone due to the COVID-19 pandemic.      His urinary tract infection symptoms have disappeared.  He continues to have a body temperature measured with their thermometer at around 91 degrees Fahrenheit.  He does feel cool or cold, and his wife says he looks pale.  He does not have a blotchy appearance to his skin.  He says he is sleeping okay and eating about normal.  He is not having a cough, cold or sore throat and is not having chills or sweats.      He seems to be doing okay.  He is not sure of all his medications, but says he is taking 8 pills a day.  I reviewed his medication list and tried to clear up what I could without a med list or his medication supply to review.        The patient and spouse involved in medical decision making throughout the visit.      We will recheck in 1 month, earlier if worsening symptoms or new symptoms.           Current Outpatient Medications   Medication Sig Dispense Refill     bicalutamide (CASODEX) 50 MG tablet TK 1 T PO QD  0     Disposable " "Gloves (SYNTHETIC VINYL EXAM GLOVES) MISC 1 Package 5 times daily 100 each 11     FLUoxetine (PROZAC) 40 MG capsule Take 1 capsule (40 mg) by mouth daily 90 capsule 4     losartan (COZAAR) 50 MG tablet Take 1 tablet (50 mg) by mouth daily 90 tablet 3     methylphenidate (CONCERTA) 27 MG CR tablet TK 1 T PO QD  0     metoprolol succinate ER (TOPROL-XL) 25 MG 24 hr tablet Take 1 tablet (25 mg) by mouth daily 90 tablet 4     nitroGLYcerin (NITROSTAT) 0.4 MG sublingual tablet Place 1 tablet (0.4 mg) under the tongue every 5 minutes as needed for chest pain 25 tablet 11     order for DME Equipment being ordered: Saline Enema, 4.5 fl oz bottle size 1 Device 11     order for DME Equipment being ordered: gloves for cares  6 times per day (12 gloves)  Dispense 400 per month  Refill 11 400 Device 11     tamsulosin (FLOMAX) 0.4 MG capsule Take 1 tab by mouth daily. (pharmacy - please fill 90 day supply) 90 capsule 3     traZODone (DESYREL) 50 MG tablet Take 0.5 tablets (25 mg) by mouth At Bedtime 45 tablet 3     vitamin D3 (CHOLECALCIFEROL) 2000 units (50 mcg) tablet Take 1 tablet (2,000 Units) by mouth daily 100 tablet 3     Allergies   Allergen Reactions     Aspirin      Legs feel funny, pain in knee     Clam Shell      Dust Mites               Review of Systems:     Constitutional, HEENT, cardiovascular, pulmonary, gi and gu systems are negative, except as otherwise noted.         Physical Exam:     /74   Pulse 72   Temp (!) 91  F (32.8  C) (Oral)   Estimated body mass index is 24.94 kg/m  as calculated from the following:    Height as of 9/4/19: 1.727 m (5' 8\").    Weight as of 9/4/19: 74.4 kg (164 lb).    Exam:  Constitutional: healthy for him, alert and no distress  Psychiatric: mentation appears normal and usual affect            Assessment and Plan       ICD-10-CM    1. Clinical diagnosis of COVID-19  U07.1        Refilled medications that would be required in the next 3 months.     After Visit " Information:  Will print and mail AVS     Return in about 1 month (around 7/9/2020) for CV19 symptoms.    Appointment end time: 11:33 AM  This is a telephone visit that took 8 minutes.      Clinician location:  [facility], [place]

## 2020-07-02 NOTE — TELEPHONE ENCOUNTER
Received phone call from attending at [facility]. He has been caring for patient during his admission. Pt admitted for suspected pyelonephritis.    Dr Joyce noted that pt has a Parkinsonian tremor and per discussion w/ pt, he states that his son carries him up and down the stairs at home. He wanted Dr Mckeon to be aware of the high likelihood pt has an aspect of Parkinson's. Pt declined neuro eval and wanted to be discharged prior to PT/OT eval as well.     Will send this note to Dr Mckeon for follow-up.    Scotty Ragland MD  July 2, 2020  1:42 PM

## 2020-07-20 NOTE — PROGRESS NOTES
"Telephone visit to the client's home for annual health risk assessment.  An  was not needed.     Current situation/living environment  Felix is an 86 year old male who lives in a single family home with his spouse and his adult son. He has 3 children total, and he wife runs a day care of out of their home, but currently does not have any children enrolled. In the last 6 months, he reports one fall, and has been to the hospital for abdominal pain, which was the result of a kidney infection. He returned home after a few days at the hospital.     Activities of daily living (ADL)/instrumental activities of daily living (IADL) and functional issues  Client needs help with the following ADL's: dressing, grooming, bathing, bed mobility, transfers, wheeling (does not walk any longer) and toileting.  Client needs help with the following IADL's: shopping, cooking, housekeeping, laundry, managing finances/bills and transportation  Client states he is unable to perform the above due to Osteoarthritis, general weakness, \"Lack of Will\", failure to thrive, low back pain, Major Depressive disorder. Since his hospital stay in June 2020, he has been more weak and needs more ADL assistance.      Health concerns for today  Since his hospital stay at the end of June, he has been more weak. Prior to his, family describes him having fainting episodes. He is slow to move and asks family to slow down, even when they are moving really slow. It is difficult, physically, to get out, and they are exploring having a ramp installed.     Has patient fallen 2 or more times in the last year? No  Has patient fallen with injury in the last year? No     Cognition/mental health  See above-no new cognitive concerns  STARS/Med Adherence  Client is non-compliant with the following quality measures: NA  Comments: Encouraged Felix to get a Medicare Wellness Exam at the clinic. Shared the Health Promotion that [facility] offers to [facility] " members.     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 His spouse, Luz, and son Isrrael are employed by him to provide care. Also gets DME through [employer], such as incontinence products and gloves.  He is also thinking about having a ramp installed at his house, since there are many stairs to get in and out, making it difficult to get him out for appointments.    Lyndsey Winkler, [facility]  [phone]

## 2020-07-24 NOTE — TELEPHONE ENCOUNTER
[facility] phone call message- general phone call:    Reason for call: Caller would like to know if PCP will follow up with home care.    Return call needed: Yes    OK to leave a message on voice mail? Yes    Primary language: English      needed? No    Call taken on July 24, 2020 at 12:57 PM by [employer]

## 2020-07-28 NOTE — TELEPHONE ENCOUNTER
Returning call, states it could be fax or both fax or phone call. Told her that Dr. Mckeon will follow.

## 2020-07-28 NOTE — TELEPHONE ENCOUNTER
Called, left detailed message Dr Mckeon will follow but would like to know if request for orders will be communicated via fax or phone or both. Request call back to confirm. [place] ADDISON

## 2020-08-24 NOTE — RESULT ENCOUNTER NOTE
Felix,  Your urine culture showed no growth, so no infection in you urinary track.  You may stop the antibiotic at this time.  I will print out and sigh you ritalin prescription when I ham in clinic tomorrow morning.  Catracho Sarecycline Pregnancy And Lactation Text: This medication is Pregnancy Category D and not consider safe during pregnancy. It is also excreted in breast milk. Spironolactone Pregnancy And Lactation Text: This medication can cause feminization of the male fetus and should be avoided during pregnancy. The active metabolite is also found in breast milk. Minocycline Counseling: Patient advised of the possibility of headache, dizziness, GI upset, teeth/skin discoloration. The patient verbalized understanding of the proper use and possible adverse effects of minocycline and will call the office if any serious side effects develop. All of the patient's questions and concerns were addressed. Benzoyl Peroxide Counseling: Patient counseled that medicine may cause skin irritation and bleach clothing.  In the event of skin irritation, the patient was advised to reduce the amount of the drug applied or use it less frequently.   The patient verbalized understanding of the proper use and possible adverse effects of benzoyl peroxide.  All of the patient's questions and concerns were addressed. Topical Clindamycin Pregnancy And Lactation Text: This medication is Pregnancy Category B and is considered safe during pregnancy. It is unknown if it is excreted in breast milk. Bactrim Pregnancy And Lactation Text: This medication is Pregnancy Category D and is known to cause fetal risk.  It is also excreted in breast milk. Birth Control Pills Counseling: Birth Control Pill Counseling: I discussed with the patient the potential side effects of OCPs including but not limited to increased risk of stroke, heart attack, thrombophlebitis, deep venous thrombosis, hepatic adenomas, breast changes, GI upset, headaches, and depression.  The patient verbalized understanding of the proper use and possible adverse effects of OCPs. All of the patient's questions and concerns were addressed. Birth Control Pills Pregnancy And Lactation Text: This medication should be avoided if pregnant and for the first 30 days post-partum. Doxycycline Counseling:  Patient counseled regarding possible photosensitivity and increased risk for sunburn.  Patient instructed to avoid sunlight, if possible.  When exposed to sunlight, patients should wear protective clothing, sunglasses, and sunscreen.  The patient was instructed to call the office immediately if the following severe adverse effects occur:  hearing changes, easy bruising/bleeding, severe headache, or vision changes.  The patient verbalized understanding of the proper use and possible adverse effects of doxycycline.  All of the patient's questions and concerns were addressed. Azithromycin Pregnancy And Lactation Text: This medication is considered safe during pregnancy and is also secreted in breast milk. Doxycycline Pregnancy And Lactation Text: This medication is Pregnancy Category D and not consider safe during pregnancy. It is also excreted in breast milk but is considered safe for shorter treatment courses. High Dose Vitamin A Counseling: Side effects reviewed, pt to contact office should one occur. Use Enhanced Medication Counseling?: Yes Tetracycline Counseling: Patient counseled regarding possible photosensitivity and increased risk for sunburn.  Patient instructed to avoid sunlight, if possible.  When exposed to sunlight, patients should wear protective clothing, sunglasses, and sunscreen.  The patient was instructed to call the office immediately if the following severe adverse effects occur:  hearing changes, easy bruising/bleeding, severe headache, or vision changes.  The patient verbalized understanding of the proper use and possible adverse effects of tetracycline.  All of the patient's questions and concerns were addressed. Patient understands to avoid pregnancy while on therapy due to potential birth defects. Topical Sulfur Applications Counseling: Topical Sulfur Counseling: Patient counseled that this medication may cause skin irritation or allergic reactions.  In the event of skin irritation, the patient was advised to reduce the amount of the drug applied or use it less frequently.   The patient verbalized understanding of the proper use and possible adverse effects of topical sulfur application.  All of the patient's questions and concerns were addressed. Sarecycline Counseling: Patient advised regarding possible photosensitivity and discoloration of the teeth, skin, lips, tongue and gums.  Patient instructed to avoid sunlight, if possible.  When exposed to sunlight, patients should wear protective clothing, sunglasses, and sunscreen.  The patient was instructed to call the office immediately if the following severe adverse effects occur:  hearing changes, easy bruising/bleeding, severe headache, or vision changes.  The patient verbalized understanding of the proper use and possible adverse effects of sarecycline.  All of the patient's questions and concerns were addressed. Detail Level: Zone Topical Retinoid counseling:  Patient advised to apply a pea-sized amount only at bedtime and wait 30 minutes after washing their face before applying.  If too drying, patient may add a non-comedogenic moisturizer. The patient verbalized understanding of the proper use and possible adverse effects of retinoids.  All of the patient's questions and concerns were addressed. High Dose Vitamin A Pregnancy And Lactation Text: High dose vitamin A therapy is contraindicated during pregnancy and breast feeding. Isotretinoin Counseling: Patient should get monthly pregnancy tests, not donate blood, not drive at night if vision affected, not share medication, and not undergo elective surgery for 6 months after tx completed. Side effects reviewed, pt to contact office should one occur. Benzoyl Peroxide Pregnancy And Lactation Text: This medication is Pregnancy Category C. It is unknown if benzoyl peroxide is excreted in breast milk. Tazorac Counseling:  Patient advised that medication is irritating and drying.  Patient may need to apply sparingly and wash off after an hour before eventually leaving it on overnight.  The patient verbalized understanding of the proper use and possible adverse effects of tazorac.  All of the patient's questions and concerns were addressed. Bactrim Counseling:  I discussed with the patient the risks of sulfa antibiotics including but not limited to GI upset, allergic reaction, drug rash, diarrhea, dizziness and yeast infections.  Rarely, more serious reactions can occur including desquamative/blistering drug rashes. Tazorac Pregnancy And Lactation Text: This medication is not safe during pregnancy. It is unknown if this medication is excreted in breast milk. Erythromycin Pregnancy And Lactation Text: This medication is Pregnancy Category B and is considered safe during pregnancy. It is also excreted in breast milk. Isotretinoin Pregnancy And Lactation Text: This medication is Pregnancy Category X and is considered extremely dangerous during pregnancy. It is unknown if it is excreted in breast milk. Topical Retinoid Pregnancy And Lactation Text: This medication is Pregnancy Category C. It is unknown if this medication is excreted in breast milk. Dapsone Counseling: I discussed with the patient the risks of dapsone including but not limited to hemolytic anemia, agranulocytosis, rashes, methemoglobinemia, kidney failure, peripheral neuropathy, headaches, GI upset, and liver toxicity.  Patients who start dapsone require monitoring including baseline LFTs and weekly CBCs for the first month, then every month thereafter.  The patient verbalized understanding of the proper use and possible adverse effects of dapsone.  All of the patient's questions and concerns were addressed. Spironolactone Counseling: Patient advised regarding risks of diarrhea, dehydration, orthostatic hypotension, leg cramps, breast swelling/tenderness or irregular cycles.The patient verbalized understanding of the proper use and possible adverse effects of spironolactone.  All of the patient's questions and concerns were addressed. Dapsone Pregnancy And Lactation Text: This medication is Pregnancy Category C and is not considered safe during pregnancy or breast feeding. Erythromycin Counseling:  I discussed with the patient the risks of erythromycin including but not limited to GI upset, allergic reaction, drug rash, diarrhea, increase in liver enzymes, and yeast infections. Topical Sulfur Applications Pregnancy And Lactation Text: This medication is Pregnancy Category C and has an unknown safety profile during pregnancy. It is unknown if this topical medication is excreted in breast milk. Azithromycin Counseling:  I discussed with the patient the risks of azithromycin including but not limited to GI upset, allergic reaction, drug rash, diarrhea, and yeast infections. Include Pregnancy/Lactation Warning?: No Topical Clindamycin Counseling: Patient counseled that this medication may cause skin irritation or allergic reactions.  In the event of skin irritation, the patient was advised to reduce the amount of the drug applied or use it less frequently.   The patient verbalized understanding of the proper use and possible adverse effects of clindamycin.  All of the patient's questions and concerns were addressed.

## 2020-09-10 NOTE — TELEPHONE ENCOUNTER
Patient returned call. I was going to notify patient that appointment is needed but patients phone lost signal. I tried calling patient back, no answer left voicemail to call back.

## 2020-09-10 NOTE — TELEPHONE ENCOUNTER
----- Message from Katherine Avila sent at 2/4/2020 12:27 PM CST -----  Type:  Needs Medical Advice    Who Called:  Andrade Ann  Symptoms (please be specific):   Sciatic nerve problem   How long has patient had these symptoms:     Pharmacy name and phone #:     Would the patient rather a call back or a response via [facility]?  Call back  Best Call Back Number:   [phone]  Additional Information:  Pt has an appt on 2-6-20 and she has questions regarding the appt//please call//thanks/amanda     Called, left detailed message per insurance guidelines will have to have visit to support order placed for wheelchair. Felix to call back and schedule an appt with Dr Mckeon or another one of our providers. [place] ADDISON

## 2020-09-10 NOTE — TELEPHONE ENCOUNTER
[facility] phone call message - order or referral request for patient:     Order or referral being requested: 4 WHEEL WALKER      Additional Comments: Patient states his walker is broken and they threw it away. He states he needs a new one ordered. Please call and advise.     OK to leave a message on voice mail? Yes    Primary language: English      needed? No    Call taken on September 10, 2020 at 2:03 PM by Luz Pillai

## 2020-09-22 NOTE — TELEPHONE ENCOUNTER
Message to physician:     Date of last visit: 11/11/2019     Date of next visit if scheduled:none  Last Comprehensive Metabolic Panel:  Sodium   Date Value Ref Range Status   05/21/2019 140.0 133.0 - 144.0 mmol/L Final     Potassium   Date Value Ref Range Status   05/21/2019 4.6 3.4 - 5.3 mmol/L Final     Chloride   Date Value Ref Range Status   05/21/2019 100.0 94.0 - 109.0 mmol/L Final     Carbon Dioxide   Date Value Ref Range Status   05/21/2019 30.0 20.0 - 32.0 mmol/L Final     Anion Gap   Date Value Ref Range Status   03/31/2015 7 3 - 14 mmol/L Final     Glucose   Date Value Ref Range Status   05/21/2019 119.0 (H) 60.0 - 109.0 mg/dL Final     Urea Nitrogen   Date Value Ref Range Status   05/21/2019 12.0 7.0 - 30.0 mg/dL Final     Creatinine   Date Value Ref Range Status   05/21/2019 1.0 0.8 - 1.5 mg/dL Final     GFR Estimate   Date Value Ref Range Status   05/16/2017 >60 >60 mL/min/1.73m2 Final     Calcium   Date Value Ref Range Status   05/21/2019 10.0 8.5 - 10.4 mg/dL Final       BP Readings from Last 3 Encounters:   06/09/20 114/74   11/11/19 98/64   09/04/19 104/67       No results found for: A1C             Please complete refill and CLOSE ENCOUNTER.  Closing the encounter signifies the refill is complete.

## 2020-10-08 PROBLEM — U07.1 CLINICAL DIAGNOSIS OF COVID-19: Status: RESOLVED | Noted: 2020-01-01 | Resolved: 2020-01-01

## 2020-10-08 PROBLEM — Z74.01 BEDRIDDEN: Status: ACTIVE | Noted: 2020-01-01

## 2020-10-08 NOTE — PATIENT INSTRUCTIONS
You may restart the trazadone.  Please check to see if you are still taking the tamsulosin.  Please compare your daily medications to the list on this document and let us know if there are differences.  I will work on a handicap sticker for your car.

## 2020-10-08 NOTE — PROGRESS NOTES
"Family Medicine Telephone Visit Note               Telephone Visit Consent   Patient was verbally read the following and verbal consent was obtained.    \"Telephone visits are billed at different rates depending on your insurance coverage. During this emergency period, for some insurers they may be billed the same as an in-person visit.  Please reach out to your insurance provider with any questions.  If during the course of the call the physician/provider feels a telephone visit is not appropriate, you will not be charged for this service.\"    Name person giving consent:  Patient   Date verbal consent given:  10/8/2020  Time verbal consent given:  11:47 AM           Chief Complaint   Patient presents with     Medication Question     Trazadone      Medication Reconciliation     Needs attention          HPI   Patients name: Felix  Appointment start time:  11:53 AM    Service Date: 10/08/2020      REASON FOR VISIT:  An 86-year-old male being evaluated and managed by telephone visit due to pandemic.      He had a recent overdose of trazodone that was accidental.  He wants to restart the trazodone and is not sure that he has been getting it.  He is not sleeping well without it.  We will restart it and his son usually gives him his medication, so hopefully we can avoid any additional doses.  He tamsulosin has not been refilled for some time, so I have asked him to check and see if he is still taking that medication.  He would like a handicapped sticker for his car, as he is basically bedridden and needs to be carried and use a wheelchair when he goes anywhere, which is very infrequent.  I will complete the paperwork for that.       He continues on Prozac and Concerta for depression.     Patient involved in medical decision-making throughout the visit.      Recheck for a medication check in 3 months.  Earlier if problems.      His COVID-19 symptoms have resolved.         GIGI YOUNG MD             D: 10/09/2020   " "T: 10/09/2020   MT: LJ      Name:     ADDY ALMAZAN   MRN:      [id number]        Account:      [id number]   :      1933           Service Date: 10/08/2020      Document: [id number]              Current Outpatient Medications   Medication Sig Dispense Refill     bicalutamide (CASODEX) 50 MG tablet TK 1 T PO QD  0     Disposable Gloves (SYNTHETIC VINYL EXAM GLOVES) MISC 1 Package 5 times daily 100 each 11     FLUoxetine (PROZAC) 40 MG capsule Take 1 capsule (40 mg) by mouth daily 90 capsule 4     losartan (COZAAR) 50 MG tablet Take 1 tablet (50 mg) by mouth daily 90 tablet 3     methylphenidate (CONCERTA) 27 MG CR tablet TK 1 T PO QD  0     metoprolol succinate ER (TOPROL-XL) 25 MG 24 hr tablet Take 1 tablet (25 mg) by mouth daily 90 tablet 4     nitroGLYcerin (NITROSTAT) 0.4 MG sublingual tablet Place 1 tablet (0.4 mg) under the tongue every 5 minutes as needed for chest pain 25 tablet 11     order for DME Equipment being ordered: Saline Enema, 4.5 fl oz bottle size 1 Device 11     order for DME Equipment being ordered: gloves for cares  6 times per day (12 gloves)  Dispense 400 per month  Refill 11 400 Device 11     tamsulosin (FLOMAX) 0.4 MG capsule Take 1 tab by mouth daily. (pharmacy - please fill 90 day supply) 90 capsule 3     traZODone (DESYREL) 50 MG tablet Take 0.5 tablets (25 mg) by mouth At Bedtime 45 tablet 4     vitamin D3 (CHOLECALCIFEROL) 2000 units (50 mcg) tablet Take 1 tablet (2,000 Units) by mouth daily 100 tablet 3     Allergies   Allergen Reactions     Aspirin      Legs feel funny, pain in knee     Clam Shell      Dust Mites               Review of Systems:     Constitutional, HEENT, cardiovascular, pulmonary, gi and gu systems are negative, except as otherwise noted.         Physical Exam:     There were no vitals taken for this visit.  Estimated body mass index is 24.94 kg/m  as calculated from the following:    Height as of 19: 1.727 m (5' 8\").    Weight as of 19: 74.4 kg " (164 lb).    Exam:  Constitutional: alert, no distress and cooperative  Psychiatric: mentation appears normal and affect normal          Assessment and Plan       ICD-10-CM    1. Insomnia due to medical condition  G47.01    2. Failure to thrive in adult  R62.7    3. Bedridden  Z74.01    4. Severe episode of recurrent major depressive disorder, without psychotic features (H)  F33.2        Refilled medications that would be required in the next 3 months.     After Visit Information:  Will print and mail AVS     Return in about 3 months (around 1/8/2021) for Routine Visit for medication check.    Appointment end time: 12:02 PM  This is a telephone visit that took 9 minutes.      Clinician location:  [facility]     Torres Mckeon MD

## 2020-10-09 NOTE — PROGRESS NOTES
Service Date: 10/08/2020      REASON FOR VISIT:  An 86-year-old male being evaluated and managed by telephone visit due to pandemic.      He had a recent overdose of trazodone that was accidental.  He wants to restart the trazodone and is not sure that he has been getting it.  He is not sleeping well without it.  We will restart it and his son usually gives him his medication, so hopefully we can avoid any additional doses.  He tamsulosin has not been refilled for some time, so I have asked him to check and see if he is still taking that medication.  He would like a handicapped sticker for his car, as he is basically bedridden and needs to be carried and use a wheelchair when he goes anywhere, which is very infrequent.  I will complete the paperwork for that.        Patient involved in medical decision-making throughout the visit.      Recheck for a medication check in 3 months.  Earlier if problems.      His COVID-19 symptoms have resolved.         GIGI YOUNG MD             D: 10/09/2020   T: 10/09/2020   MT: SOPHIA      Name:     ADDY ALMAZAN   MRN:      [id number]        Account:      [id number]   :      1933           Service Date: 10/08/2020      Document: [id number]

## 2020-12-04 NOTE — PROGRESS NOTES
"Family Medicine Telephvone Visit Note         Telephone Visit Consent   Patient was verbally read the following and verbal consent was obtained.    \"Telephone visits are billed at different rates depending on your insurance coverage. During this emergency period, for some insurers they may be billed the same as an in-person visit.  Please reach out to your insurance provider with any questions.  If during the course of the call the physician/provider feels a telephone visit is not appropriate, you will not be charged for this service.\"    Name person giving consent:  Patient ok to discuss health with wife Luz received today  Date verbal consent given:  12/4/2020  Time verbal consent given:  3:20 PM       Chief Complaint   Patient presents with     Urinary Problem     concerns for blockage- pain at tip of urethra this monring, took Ibuprofen and not having pain anymore as of now also dark urine. more concentrated today viewable in depends, tired fatigue, no blood visable      Medication Reconciliation     not completed                HPI   Patients name: Felix  Appointment start time:  3:47 PM    One day ago, went to urology  -Started to get painful sensation in penis today  -Feels like he is blocked and cannot urinate appropriately   -Did have a surgery for this specifically  -Took ibuprofen 600 mg and the pain went away  -He has been able to pee today okay  -He is apparently completely fine right now  -Patient denies dysuria or urinary blockage symptoms  -They do endorse some frequency  -Denies fevers  -His normal temp is 91-92 degrees F  -No penile rash    Plan from urology yesterday:  \"Advanced prostate cancer. Patient wishes to continue hormonal ablated therapy. Orders placed for this. 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  He was ultimately diagnosed with a large right midureteral stone burden.  He had multiple bladder calculi and a large trabeculated bladder.     The patient initially refused surgery with Dr. Meek but accepted pursuing surgery of the next day.  Apparently the patient had some concerns regarding Gen. anesthesia.  Of note the patient did have a primary urologist outside of [facility] prior to his visits here.     The patient was on antibiotics until definitive ureteroscopy on 6/3/2019.  In this setting the patient also had significant cystolitholapaxy.   his stent was subsequently removed.     Patient returns again today in follow-up.   as per previous he remains point argumentative regarding every aspect of his care. his last visit was in November 2019. He was to follow up in 6 months for repeat Lupron dosing but did not do so. Reason for this is not completely clear.\"      Current Outpatient Medications   Medication Sig Dispense Refill     bicalutamide (CASODEX) 50 MG tablet TK 1 T PO QD  0     Disposable Gloves (SYNTHETIC VINYL EXAM GLOVES) MISC 1 Package 5 times daily 100 each 11     FLUoxetine (PROZAC) 40 MG capsule Take 1 capsule (40 mg) by mouth daily 90 capsule 4     losartan (COZAAR) 50 MG tablet Take 1 tablet (50 mg) by mouth daily 90 tablet 3     methylphenidate (CONCERTA) 27 MG CR tablet TK 1 T PO QD  0     metoprolol succinate ER (TOPROL-XL) 25 MG 24 hr tablet Take 1 tablet (25 mg) by mouth daily 90 tablet 4     nitroGLYcerin (NITROSTAT) 0.4 MG sublingual tablet Place 1 tablet (0.4 mg) under the tongue every 5 minutes as needed for chest pain 25 tablet 11     order for DME Equipment being ordered: Saline Enema, 4.5 fl oz " "bottle size 1 Device 11     order for DME Equipment being ordered: gloves for cares  6 times per day (12 gloves)  Dispense 400 per month  Refill 11 400 Device 11     tamsulosin (FLOMAX) 0.4 MG capsule Take 1 tab by mouth daily. (pharmacy - please fill 90 day supply) 90 capsule 3     traZODone (DESYREL) 50 MG tablet Take 0.5 tablets (25 mg) by mouth At Bedtime 45 tablet 4     vitamin D3 (CHOLECALCIFEROL) 2000 units (50 mcg) tablet Take 1 tablet (2,000 Units) by mouth daily 100 tablet 3     Allergies   Allergen Reactions     Aspirin      Legs feel funny, pain in knee     Clam Shell      Dust Mites               Review of Systems:     A 10 point review of systems including constitutional, cardiovascular, respiratory, HEENT, GI, , neurological, MSK, skin, endocrine, is negative unless stated in HPI         Physical Exam:     There were no vitals taken for this visit.  Estimated body mass index is 24.94 kg/m  as calculated from the following:    Height as of 9/4/19: 1.727 m (5' 8\").    Weight as of 9/4/19: 74.4 kg (164 lb).    Exam:  Constitutional: healthy, alert and no distress  Psychiatric: mentation appears normal and affect normal/bright          Assessment and Plan     1. Malignant neoplasm of prostate (H)  2. Urinary (tract) obstruction  Sounds like he possibly had an obstruction that has since resolved, but difficult to say. Wife states he is at his baseline at this point in time. We discussed that if he starts looking more sick, that they need to present to the ED, which wife agreed to.     Refilled medications that would be required in the next 3 months.     After Visit Information:  Will print and mail AVS     No follow-ups on file.    Appointment end time: 400 pm  This is a telephone visit that took 13 minutes.      Clinician location:  [facility]     eKvin Godinez MD  I precepted today with Dr. Ragland.            "

## 2020-12-04 NOTE — PROGRESS NOTES
I have personally reviewed the telephone encounter as documented by Dr. Godinez.  I agree with the assessment and plan as documented for 87 yr old male with advanced prostate CA evaluated for penile/ urinary symptoms. Precautions given. Anticipatory guidance given.     Scotty Ragland MD  December 4, 2020  4:44 PM

## 2021-01-01 ENCOUNTER — TELEPHONE (OUTPATIENT)
Dept: FAMILY MEDICINE | Facility: CLINIC | Age: 86
End: 2021-01-01

## 2021-01-01 ENCOUNTER — HEALTH MAINTENANCE LETTER (OUTPATIENT)
Age: 86
End: 2021-01-01

## 2021-01-01 ENCOUNTER — DOCUMENTATION ONLY (OUTPATIENT)
Dept: FAMILY MEDICINE | Facility: CLINIC | Age: 86
End: 2021-01-01

## 2021-01-01 ENCOUNTER — VIRTUAL VISIT (OUTPATIENT)
Dept: FAMILY MEDICINE | Facility: CLINIC | Age: 86
End: 2021-01-01
Payer: COMMERCIAL

## 2021-01-01 ENCOUNTER — MEDICAL CORRESPONDENCE (OUTPATIENT)
Dept: HEALTH INFORMATION MANAGEMENT | Facility: CLINIC | Age: 86
End: 2021-01-01

## 2021-01-01 DIAGNOSIS — I10 ESSENTIAL HYPERTENSION: ICD-10-CM

## 2021-01-01 DIAGNOSIS — F33.2 SEVERE EPISODE OF RECURRENT MAJOR DEPRESSIVE DISORDER, WITHOUT PSYCHOTIC FEATURES (H): ICD-10-CM

## 2021-01-01 DIAGNOSIS — C61 MALIGNANT NEOPLASM OF PROSTATE (H): ICD-10-CM

## 2021-01-01 DIAGNOSIS — R62.7 FAILURE TO THRIVE IN ADULT: ICD-10-CM

## 2021-01-01 DIAGNOSIS — G47.00 INSOMNIA, UNSPECIFIED TYPE: ICD-10-CM

## 2021-01-01 DIAGNOSIS — N40.1 BENIGN PROSTATIC HYPERPLASIA WITH LOWER URINARY TRACT SYMPTOMS, SYMPTOM DETAILS UNSPECIFIED: ICD-10-CM

## 2021-01-01 DIAGNOSIS — I63.9 CEREBROVASCULAR ACCIDENT (CVA), UNSPECIFIED MECHANISM (H): Primary | ICD-10-CM

## 2021-01-01 PROCEDURE — 99443 PR PHYSICIAN TELEPHONE EVALUATION 21-30 MIN: CPT | Mod: 95 | Performed by: FAMILY MEDICINE

## 2021-01-01 RX ORDER — LISDEXAMFETAMINE DIMESYLATE 40 MG/1
1 CAPSULE ORAL DAILY
COMMUNITY
Start: 2021-01-01 | End: 2021-01-01

## 2021-01-01 RX ORDER — TAMSULOSIN HYDROCHLORIDE 0.4 MG/1
CAPSULE ORAL
Qty: 90 CAPSULE | Refills: 3 | Status: SHIPPED | OUTPATIENT
Start: 2021-01-01

## 2021-01-01 RX ORDER — AMLODIPINE BESYLATE 5 MG/1
5 TABLET ORAL DAILY
Qty: 90 TABLET | Refills: 4 | Status: SHIPPED | OUTPATIENT
Start: 2021-01-01

## 2021-01-01 RX ORDER — ROSUVASTATIN CALCIUM 5 MG/1
5 TABLET, COATED ORAL DAILY
COMMUNITY
Start: 2021-01-01 | End: 2022-02-04

## 2021-01-01 RX ORDER — TRAZODONE HYDROCHLORIDE 50 MG/1
25 TABLET, FILM COATED ORAL
Qty: 45 TABLET | Refills: 4 | Status: SHIPPED | OUTPATIENT
Start: 2021-01-01

## 2021-01-01 RX ORDER — ASPIRIN 81 MG/1
81 TABLET, CHEWABLE ORAL DAILY
COMMUNITY
Start: 2021-01-01 | End: 2021-01-01

## 2021-02-05 NOTE — TELEPHONE ENCOUNTER
[facility] phone call message- general phone call:    Reason for call: Per patient, would like to delay start of care to 2/10. Also would like to know if PCP Sign plan of care order and home care orders    Return call needed: Yes    OK to leave a message on voice mail? Yes    Primary language: English      needed? No    Call taken on February 5, 2021 at 3:49 PM by Last Clements

## 2021-02-08 NOTE — TELEPHONE ENCOUNTER
Called home care and left detailed VM on identified Home Care VM providing verbal orders for those requested. Porfirio ADDISON

## 2021-02-09 PROBLEM — I63.9 CEREBROVASCULAR ACCIDENT (CVA), UNSPECIFIED MECHANISM (H): Status: ACTIVE | Noted: 2021-01-01

## 2021-02-09 NOTE — NURSING NOTE
Obtained verbal consent from patient it is ok to speak with his wife - Dane Bangura for today's visit.

## 2021-02-09 NOTE — PROGRESS NOTES
Family Medicine Telephone Visit Note      Chief Complaint   Patient presents with     Hospital F/U     Pt. was seen at [facility] due to stroke pt. fluctuates on his state of being.      Medication Reconciliation     Needs attention              HPI   Patients name: Felix  Appointment start time:  12:31 PM    TELEPHONE VISIT      87-year-old male being seen for hospital followup by telephone due to the COVID-19 pandemic.  He was hospitalized on 01/30 through 02/05.  He had a stroke.  He was frail when he went in and is even more frail now.  He is unable to really come to over the phone and talk.  He is mostly sleeping.  It sounds like he is slowly passing away.  Family still holds out hope that he will improve.  I think that is helpful for them.  We reviewed his medication list and it should be updated and correct at this time.  His blood pressure is in a good range today.  He did not come to the phone during this visit.  I spoke mainly with his wife and his son.      We will follow up in 2 weeks.  Keep his current medications.  We will start to withdraw any medications that seem excessive or needed as time passes here.  We will reevaluate earlier if needed.  Wife and son involved in medical decision making throughout the visit.           Current Outpatient Medications   Medication Sig Dispense Refill     amLODIPine (NORVASC) 5 MG tablet Take 1 tablet (5 mg) by mouth daily 90 tablet 4     bicalutamide (CASODEX) 50 MG tablet TK 1 T PO QD  0     FLUoxetine (PROZAC) 40 MG capsule Take 1 capsule (40 mg) by mouth daily 90 capsule 4     rosuvastatin (CRESTOR) 5 MG tablet Take 5 mg by mouth daily       tamsulosin (FLOMAX) 0.4 MG capsule Take 1 tab by mouth daily. (pharmacy - please fill 90 day supply) 90 capsule 3     traZODone (DESYREL) 50 MG tablet Take 0.5 tablets (25 mg) by mouth nightly as needed for sleep 45 tablet 4     vitamin B-12 (CYANOCOBALAMIN) 1000 MCG tablet Take 1,000 mcg by mouth daily       vitamin D3  "(CHOLECALCIFEROL) 2000 units (50 mcg) tablet Take 1 tablet (2,000 Units) by mouth daily 100 tablet 3     Disposable Gloves (SYNTHETIC VINYL EXAM GLOVES) MISC 1 Package 5 times daily 100 each 11     nitroGLYcerin (NITROSTAT) 0.4 MG sublingual tablet Place 1 tablet (0.4 mg) under the tongue every 5 minutes as needed for chest pain 25 tablet 11     order for DME Equipment being ordered: Saline Enema, 4.5 fl oz bottle size 1 Device 11     order for DME Equipment being ordered: gloves for cares  6 times per day (12 gloves)  Dispense 400 per month  Refill 11 400 Device 11     Allergies   Allergen Reactions     Aspirin      Legs feel funny, pain in knee     Clam Shell      Dust Mites               Review of Systems:     Constitutional, HEENT, cardiovascular, pulmonary, gi and gu systems are negative, except as otherwise noted.         Physical Exam:     There were no vitals taken for this visit.  Estimated body mass index is 24.94 kg/m  as calculated from the following:    Height as of 9/4/19: 1.727 m (5' 8\").    Weight as of 9/4/19: 74.4 kg (164 lb).    Exam:  Constitutional: mild distress, cooperative and not doing well  Psychiatric: disoriented and not well oriented    Results from last visit:  Office Visit on 11/11/2019   Component Date Value Ref Range Status     Specific Gravity Urine 11/11/2019 1.025  1.005 - 1.030 Final     pH Urine 11/11/2019 6.5  4.5 - 8.0 Final     Leukocyte Esterase UR 11/11/2019 3+* NEGATIVE Final     Nitrite Urine 11/11/2019 Positive* NEGATIVE mg/dL Final     Protein UR 11/11/2019 Negative  NEGATIVE mg/dL Final     Glucose Urine 11/11/2019 Negative  NEGATIVE Final     Ketones Urine 11/11/2019 Negative  NEGATIVE mg/dL Final     Urobilinogen mg/dL 11/11/2019 1.0 E.U./dL* 0.2 E.U./dL E.U./dL Final     Bilirubin UR 11/11/2019 Negative  NEGATIVE mg/dL Final     Blood UR 11/11/2019 Trace-intact* NEGATIVE mg/dL Final     Color Urine 11/11/2019 Yellow   Final     Clarity, urine 11/11/2019 Clear   " Final     WBC Urine 11/11/2019 5-10  <5 /hpf Final     RBC Urine 11/11/2019 2-5  <5 /hpf Final     Epithelial Cells UR 11/11/2019 10-25  0 - 2 /lpf Final     Mucous Urine 11/11/2019 None  NONE lpf Final     Casts Urine 11/11/2019 None  NONE /lpf Final     Crystal Urine 11/11/2019 Calcium Oxalate* NONE /lpf Final     Bacteria Wet Prep 11/11/2019 Many  None Final     Culture 11/11/2019 SEE RESULTS BELOW*  Final    Comment: CULTURE, URINE   SOURCE: Urine, Clean Catch   CULTURE RESULTS:    Mixture of urogenital organisms with    Org 1: >100,000 col/ml Enterococcus faecalis                     Org 1  Ampicillin      2 S                             Nitrofurantoin  <=16 S                          Levofloxacin    >4 R                            Tetracycline    >8 R                                         Assessment and Plan       ICD-10-CM    1. Cerebrovascular accident (CVA), unspecified mechanism (H)  I63.9    2. Severe episode of recurrent major depressive disorder, without psychotic features (H)  F33.2    3. Malignant neoplasm of prostate (H)  C61    4. Failure to thrive in adult  R62.7    5. Benign prostatic hyperplasia with lower urinary tract symptoms, symptom details unspecified  N40.1 tamsulosin (FLOMAX) 0.4 MG capsule   6. Insomnia, unspecified type  G47.00 traZODone (DESYREL) 50 MG tablet   7. Essential hypertension  I10 amLODIPine (NORVASC) 5 MG tablet       Refilled medications that would be required in the next 3 months.     After Visit Information:  Will print and mail AVS     Return in about 2 weeks (around 2/23/2021) for stroke follow up.    Appointment end time: 1:06 PM  This is a telephone visit that took 35 minutes.      Clinician location:  [facility]     Torres Mckeon MD    Hospitalization Follow-up Visit         [facility]       Hospital Follow-up Visit:    Hospital:  [facility]   Date of Admission: 1-30-21  Date of Discharge: 2-5-21  Reason(s) for Admission: Stroke             Problems taking medications regularly:  None       Post Discharge Medication Reconciliation: discharge medications reconciled, continue medications without change.       Problems adhering to non-medication therapy:  None       Medications reviewed by: by myself. Findings include above.    Summary of hospitalization:  CareEverywhere information obtained and reviewed  Diagnostic Tests/Treatments reviewed.  Follow up needed: none  Other Healthcare Providers Involved in Patient s Care:         Paliative care  Update since discharge: fluctuating course. Not waking up easily  Plan of care communicated with family                   Review of Systems:               Physical Exam:     There were no vitals filed for this visit.  There is no height or weight on file to calculate BMI.    Phone visit         Results:   Results from last visit No results found for any visits on 02/09/21.    Assessment and Plan      Felix was seen today for hospital f/u and medication reconciliation.    Diagnoses and all orders for this visit:    Cerebrovascular accident (CVA), unspecified mechanism (H)    Severe episode of recurrent major depressive disorder, without psychotic features (H)    Malignant neoplasm of prostate (H)    Failure to thrive in adult    Benign prostatic hyperplasia with lower urinary tract symptoms, symptom details unspecified  -     tamsulosin (FLOMAX) 0.4 MG capsule; Take 1 tab by mouth daily. (pharmacy - please fill 90 day supply)    Insomnia, unspecified type  -     traZODone (DESYREL) 50 MG tablet; Take 0.5 tablets (25 mg) by mouth nightly as needed for sleep    Essential hypertension  -     amLODIPine (NORVASC) 5 MG tablet; Take 1 tablet (5 mg) by mouth daily          E&M code to be billed if TCM cannot be: 65836    Type of decision making: High complexity (22946)      Options for treatment and follow-up care were reviewed with the patient  Felix Bangura   engaged in the decision making process and verbalized  understanding of the options discussed and agreed with the final plan.      Torres Mckeon MD

## 2021-02-10 NOTE — TELEPHONE ENCOUNTER
[facility] phone call message - order or referral request for patient:     Order or referral being requested: Eval and treat for PT, OT and ST.      Additional Comments:     OK to leave a message on voice mail? Yes    Primary language: English      needed? No    Call taken on February 10, 2021 at 3:50 PM by Last Clements

## 2021-02-12 NOTE — TELEPHONE ENCOUNTER
RN NOTE



PT SLEEPING, EASILY AROUSABLE BY VERBAL STIMULI. NO DISTRESS NOTED. O2 SAT AT 94 %. WILL 
CONTINUE TO MONITOR. [facility] phone call message- patient requesting a refill:    Full Medication Name: Methylphenidate    Dose: 10mg    Pharmacy confirmed as   [id number] - [place], [place] - [address] [place] [address] & [facility]  [address] [place] [address]  Phone: [phone] Fax: [phone]    [facility] - [place], [place] - [address] [facility]  [address] [place] [address]  Phone: [phone] Fax: [phone]    [facility] - [place], [place] - [address] [address] [facility] AT [facility] [address] [place] [address]  Phone: [phone] Fax: [phone]  : Yes    Additional Comments:      OK to leave a message on voice mail? Yes    Primary language: English      needed? No    Call taken on December 10, 2018 at 9:11 AM by Macarena Dill

## 2021-02-15 NOTE — PROGRESS NOTES
Telephone visit to the client's home for CHANGE IN CONDITION health risk assessment.  An  was not needed.     Current situation/living environment  Felix is an 87 year old male who lives in a single family home with his spouse and his adult son. He has 3 children total, and he wife runs a day care of out of their home, but currently does not have any children enrolled. In the last year, he has had two Hospital visits, one related to an accidental overdose of trazodone and more recently, in January 2021, when he suffered a stroke. He is back home now with his family, who provide great care for him 24hrs/day.     Activities of daily living (ADL)/instrumental activities of daily living (IADL) and functional issues  Client needs help with the following ADL's: dressing, grooming, bathing, bed mobility, transfers, wheeling (does not walk any longer) and toileting. He is also on a nectar thickened diet and has to be fed. This is a change for him since last assessment.   Client needs help with the following IADL's: shopping, cooking, housekeeping, laundry, managing finances/bills and transportation  Client states he is unable to perform the above due to Osteoarthritis, CVA, Prostate Cancer     Health concerns for today  Felix suffered a stroke and was rushed to the hospital in January 2021 when family noticed facial drooping. He is back home, where family provides 24hrs/care each day. He currently has [facility] coming in to the home for PT, OT and ST. It was discussed in the hospital Felix transitioning to hospice care, and after talking to his spouse Luz merary, she is possibly open to the idea of palliative care. She wants to see how Felix responds to home care and the services they are providing.     Has patient fallen 2 or more times in the last year? No  Has patient fallen with injury in the last year? No     Cognition/mental health  Per Felix Escobar is difficult to understand and he  isn't talking much.     STARS/Med Adherence  Client is non-compliant with the following quality measures: NA  Comments: Felix would be do for a Medicare Wellness Visit, but given his current state of health, I did not discuss today.     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 His spouse, Luz, and son Isrrael are employed by him to provide care. Also gets DME through [employer], such as incontinence products and gloves.  OT called me stating he would benefit from a toilet safety frame. Will order from [employer].    Lyndsey Winkler, APARNA  [phone]

## 2021-02-17 NOTE — TELEPHONE ENCOUNTER
[facility] phone call message - order or referral request for patient:     Order or referral being requested: Discharging from home care PT due to transfering to hospice care. Family has been educated to take care of a pressure wound that patient has developed. Please return call when message has been received per caller.       Additional Comments:     OK to leave a message on voice mail? No    Primary language: English      needed? No    Call taken on February 17, 2021 at 3:58 PM by Last Clements

## 2021-02-18 NOTE — TELEPHONE ENCOUNTER
Called [facility] and advised PCP reviewed message and approves of discharge from home PT. Porfirio ADDISON

## 2021-02-24 NOTE — TELEPHONE ENCOUNTER
[facility] phone call message- general phone call:    Reason for call: FYI: Just calling to let Dr. Mckeon know that Patient has been admitted yesterday for hospice.     Return call needed: No    OK to leave a message on voice mail?     Primary language: English      needed? No    Call taken on February 24, 2021 at 8:52 AM by Doug Carreon

## 2021-03-25 ENCOUNTER — TELEPHONE (OUTPATIENT)
Dept: FAMILY MEDICINE | Facility: CLINIC | Age: 86
End: 2021-03-25

## 2021-03-25 NOTE — TELEPHONE ENCOUNTER
[facility] phone call message- general phone call:    Reason for call: Caller wanted to FYI PCP that patient past away on [place] 3/21/21    Return call needed: No    OK to leave a message on voice mail? Yes    Primary language: English      needed? No    Call taken on March 25, 2021 at 8:39 AM by Last Clements

## 2021-05-26 ENCOUNTER — RECORDS - HEALTHEAST (OUTPATIENT)
Dept: ADMINISTRATIVE | Facility: CLINIC | Age: 86
End: 2021-05-26

## 2021-05-27 NOTE — PROGRESS NOTES
[facility]    Facility:   [facility] [[id number]]   Code Status: FULL CODE      CHIEF COMPLAINT/REASON FOR VISIT:  Chief Complaint   Patient presents with     Review Of Multiple Medical Conditions       HISTORY:      HPI: Felix is a 85 y.o. male with past medical history significant for anxiety, depression, hypertension, hyperlipidemia, prostate cancer, [place] encephalitis, Guillain Barré who was most recently treated for evaluation of intermittent episodes of generalized weakness resulting in falls as well acute cystitis.   He is now in the TCU for gait training and strengthening.    UTI: Currently finishing up Cefpodoxime for UTI, he is asking for the results of the culture however have not received from discharging hospital.  Denies current symptoms.    Major depression: Patient takes Ritalin 10 daily for this has been for many years, will renew this while he is in the TCU.    Constipation: Patient was initially resistant to taking any medications for this however he is agreed to start senna S tablet given that he is quite bound up and ambulation is limited.       Past Medical History:   Diagnosis Date     Anhedonia     on ritalin     Ankle fracture     Left     Anxiety      Aortic stenosis      Arthritis      Cancer (H)     prostate     Claustrophobia      Depression      First degree heart block      Generalized weakness      Guillain Barré syndrome (H)      Hard of hearing      Heart murmur      Hiatal hernia      History of pneumonia      History of UTI      Hydronephrosis      Hypercholesteremia      Hyperlipidemia      Hypertension      Insomnia      ANA (obstructive sleep apnea)      Renal calculus, right      [place] encephalitis      Umbilical hernia              Family History   Problem Relation Age of Onset     Cancer Father         melanoma     Glaucoma Mother      Acute Myocardial Infarction Mother      Coronary artery disease Maternal Grandfather       Coronary artery disease Paternal Grandfather      Social History     Socioeconomic History     Marital status:      Spouse name: Not on file     Number of children: Not on file     Years of education: Not on file     Highest education level: Not on file   Occupational History     Not on file   Social Needs     Financial resource strain: Not on file     Food insecurity:     Worry: Not on file     Inability: Not on file     Transportation needs:     Medical: Not on file     Non-medical: Not on file   Tobacco Use     Smoking status: Former Smoker     Packs/day: 2.00     Years: 25.00     Pack years: 50.00     Last attempt to quit: 1975     Years since quittin.3     Smokeless tobacco: Never Used     Tobacco comment: Quit    Substance and Sexual Activity     Alcohol use: No     Drug use: No     Sexual activity: Not on file   Lifestyle     Physical activity:     Days per week: Not on file     Minutes per session: Not on file     Stress: Not on file   Relationships     Social connections:     Talks on phone: Not on file     Gets together: Not on file     Attends Zoroastrianism service: Not on file     Active member of club or organization: Not on file     Attends meetings of clubs or organizations: Not on file     Relationship status: Not on file     Intimate partner violence:     Fear of current or ex partner: Not on file     Emotionally abused: Not on file     Physically abused: Not on file     Forced sexual activity: Not on file   Other Topics Concern     Not on file   Social History Narrative    PhD in engineering.  Lives with his wife and adult children.  Reports that his son assists him with his medications.         Review of Systems   Constitutional: Negative.    HENT: Negative.    Respiratory: Negative.    Cardiovascular: Negative.    Gastrointestinal: Positive for constipation.   Genitourinary: Negative.    Musculoskeletal: Negative.    Skin: Negative.        .  Vitals:    19 1352   BP: 133/80    Pulse: 67   Temp: 97.3  F (36.3  C)   SpO2: 93%   Weight: 160 lb (72.6 kg)       Physical Exam   Constitutional: He is oriented to person, place, and time. He appears well-developed and well-nourished.   HENT:   Head: Atraumatic.   Eyes: No scleral icterus.   Cardiovascular: Normal rate.   Murmur heard.  Pulmonary/Chest: No respiratory distress. He has no wheezes.   Abdominal: Soft. He exhibits no distension. There is no tenderness.   Musculoskeletal: He exhibits edema.   Neurological: He is oriented to person, place, and time.   Skin: Skin is warm and dry.   Psychiatric:   Flat affect.         LABS:   Reviewed.    ASSESSMENT:      ICD-10-CM    1. Generalized weakness R53.1    2. Prostate cancer (H) C61    3. Essential hypertension I10    4. Advanced care planning/counseling discussion Z71.89        PLAN:    Continue PT/OT as ordered.  BMP and CBC scheduled for Thursday.  Start senna S1 tab p.o. twice daily.  Increase fluids, fiber supplements and ambulation as able.  Admission history and physical per MD in the next week. At this time continue current care plan for all chronic medical conditions, as they are stable. Encouraged patient to engage in PT/OT for strengthening and conditioning. Encouraged patient to work closely with nursing staff to ensure any medical complaints are quickly addressed. Follow up this week or sooner if needed. Will continue to monitor patient and work with nursing staff collaboratively to work toward positive patient outcomes.      Total 35 minutes of which 50% was spent counseling and coordination of care of the above plan.    Electronically signed by: Caroline Jones CNP

## 2021-05-28 NOTE — PROGRESS NOTES
[facility]    Facility:   [facility] [[id number]]   Code Status: POLST AVAILABLE  85-year-old male is seen for follow up evaluation and discharge planning. History of hypertension, significant depression, adjustment disorder, chronic insomnia, cared for by wife and family members in home setting, admitted to hospital with weakness, right hydronephrosis present, urine culture negative, infected ureteral stone suspected, on Vantin, transferred to TCU for rehabilitation and management of medical problems. Course in TCU uncomplicated, afebrile throughout, urinary incontinence present, fatigue improving, patient reports he is discharging to home rather than continuing therapy in TCU.    Review of systems: patient states he is going home today or tomorrow. Has participated in physical therapy, has not yet performed stairs. Chronic constipation increasing, states he uses a suppository Q third day, currently on senna S PRN. Urinary incontinence troublesome to patient, worsened post recent hospitalization, no dysuria. Chronic insomnia, takes trazodone Q HS routinely, currently ordered PRN. Denies chest pain or palpitations. No fever sweats or chills. No focal neurologic symptoms. Energy level gradually improving. Was scheduled for urology appointment today which was canceled. Follow up appointment in approximately one week. Remainder of 12 point review of systems obtained negative.    Nursing staff report patient is progressing in therapy. Afebrile. Irritability which they attribute potentially to Ritalin, sleeps majority of time, in bed majority of time, appetite adequate.    Exam: vital signs reviewed over past 48 hours, afebrile. Patient sitting upright in chair, in no apparent distress, affect flat, conversant and with multiple questions. No facial asymmetry. Oriented times three. Mucous membranes moist. No HJR. Thyroid midline and regular. S1 and S2 regular. Pulmonary exam without  rales rhonchi or wheezes. Abdomen without masses tenderness organomegaly. Periphery without edema, trace venous stasis changes. Pedal pulses minus one and symmetrical. No hand drift.    Impression and plan:   infected ureteral stone, urology appointment canceled today, continue vantin times one week until urology follow-up.   Recent hydronephrosis, followed by urology.   Hypertension with adequate control of blood pressure.   Chronic depression on Prozac and Ritalin, affect remains flat, patient denies current depression.   Deconditioning, participating in physical therapy, strength gradually improving, has not performed stairs, patient however reports he is discharging to home within 24 hours. Discharge summary is therefore supplied with anticipated discharge in 24 hours, patient will require ongoing physical therapy and occupational therapy in home setting, no evidence of current infection, will continue vantin, discharge medications are as follows:  Current Outpatient Medications:      amLODIPine (NORVASC) 5 MG tablet, Take 1 tablet (5 mg total) by mouth daily. For blood pressure., Disp: 30 tablet, Rfl: 0     bicalutamide (CASODEX) 50 MG tablet, Take 50 mg by mouth daily., Disp: , Rfl:      cholecalciferol, vitamin D3, 1,000 unit tablet, Take 2,000 Units by mouth daily., Disp: , Rfl:      FLUoxetine (PROZAC) 20 MG capsule, Take 40 mg by mouth daily.    , Disp: , Rfl:      melatonin 3 mg Tab tablet, Take 3 mg by mouth at bedtime., Disp: , Rfl:      methylphenidate (RITALIN) 10 MG tablet, Take 1 tablet (10 mg total) by mouth 2 times daily before breakfast and lunch. For depression/anxiety, Disp: 60 tablet, Rfl: 0     metoprolol succinate (TOPROL-XL) 25 MG, Take 1 tablet (25 mg total) by mouth daily. For blood pressure. (Patient taking differently: Take 12.5 mg by mouth daily. For blood pressure.    ), Disp: 30 tablet, Rfl: 0     nitroglycerin (NITROSTAT) 0.4 MG SL tablet, Place 0.4 mg under the tongue every 5  (five) minutes as needed for chest pain., Disp: , Rfl:      senna-docusate (SENNOSIDES-DOCUSATE SODIUM) 8.6-50 mg tablet, Take 1 tablet by mouth 2 (two) times a day., Disp: , Rfl:      tamsulosin (FLOMAX) 0.4 mg cap, Take 0.4 mg by mouth Daily after breakfast., Disp: , Rfl:      traZODone (DESYREL) 50 MG tablet, Take 25 mg by mouth at bedtime as needed.    , Disp: , Rfl:  senna S increase to two tablets b.i.d. with constipation present, change trazodone to 25 mg QHS routinely. Total time of discharge evaluation greater than 30 minutes, greater than 50% of time spent in coordination of care and counseling.        Electronically signed by: Luci Harrell MD

## 2021-05-28 NOTE — PROGRESS NOTES
[facility]    Facility:   [facility] [[id number]]   Code Status: POLST AVAILABLE    Admission evaluation to TCU of 85-year-old male. History is taken from accompanying hospital notes, patient is able to provide an adequate history. Long-standing hypertension, significant depression on Prozac and Ritalin, cared for by wife in home setting, admitted to hospital with weakness, presumed UTI and infected right stone with right hydroureter, stent placed, antibiotic intervention, transferred to TCU for rehabilitation and management of multiple medical problems.    Past medical history, current medical problem list, drug allergies, current medication list, social history, family history, code status reviewed in epic.    Review of systems: profound weakness. Desires to discuss all details of his hospitalization, questions when stent will be removed and procedure planned. Denies dysuria or hematuria. No active abdominal pain. No chest pain or palpitations. Questions why he is unable to function independently, states he does not bathe or brush his teeth independently, has not been able to do so since past ankle surgery. A number of concerns regarding personal issues express. Remainder of 12 point review of systems obtained negative. Additional discussion with son and wife in attendance.    Exam: vital signs reviewed since admission to TCU. In bed, articulate, oriented, strength diffusely diminished as is muscle tone. No facial asymmetry. Pharynx without erythema. No carotid bruits. S1 and S2 regular. Pulmonary exam without rales rhonchi or wheezes. Abdomen without masses tenderness organomegaly. Periphery without edema. Joints without acute inflammatory change. No hand drift.    Impression and plan: infected right ureteral stone, afebrile, hydronephrosis, stent in place, anticipated follow up with surgery planned, remains afebrile, issues related to infected stone reviewed in detail, urine  culture negative. Hypertension with adequate control of blood pressure. Chronic depression on Prozac and Ritalin, mood diminished with recent medical events. Chronic profound deconditioning multifactorial, rehabilitation indicated. Total time of admission evaluation greater than 60 minutes, greater than 50% of time spent in coordination of care and counseling, extensive discussion with patient wife and son. Additional discussion with nursing staff.      Electronically signed by: Luci Harrell MD

## 2021-05-28 NOTE — PROGRESS NOTES
[facility]    Facility:   [facility] [[id number]]   Code Status: FULL CODE      CHIEF COMPLAINT/REASON FOR VISIT:  Chief Complaint   Patient presents with     Problem Visit       HISTORY:      HPI: Felix is a 85 y.o. male with past medical history significant for anxiety, depression, hypertension, hyperlipidemia, prostate cancer, [place] encephalitis, Guillain Barré who was most recently treated for evaluation of intermittent episodes of generalized weakness resulting in falls as well acute cystitis.   He is now in the TCU for gait training and strengthening.    Today; Seen for c/o LLE pain that is chronic. He has history of fracture with screws in place and he reports this pain occurs intermittently for the last 12 months. No redness, localized swelling, or reproducible pain. Can do passive ROM with no issues. Is still attending therapies. Reports he takes ibuprofen at home but here is requesting lidocaine patch.     UTI: Currently finishing up Cefpodoxime for UTI, he is asking for the results of the culture however have not received from discharging hospital.  Denies current symptoms.    Major depression: Patient takes Ritalin 10 daily for this has been for many years, will renew this while he is in the TCU.    Constipation: Patient was initially resistant to taking any medications for this however he is agreed to start senna S tablet given that he is quite bound up and ambulation is limited.       Past Medical History:   Diagnosis Date     Anhedonia     on ritalin     Ankle fracture     Left     Anxiety      Aortic stenosis      Arthritis      Cancer (H)     prostate     Claustrophobia      Depression      First degree heart block      Generalized weakness      Guillain Barré syndrome (H)      Hard of hearing      Heart murmur      Hiatal hernia      History of pneumonia      History of UTI      Hydronephrosis      Hypercholesteremia      Hyperlipidemia      Hypertension       Insomnia      ANA (obstructive sleep apnea)      Renal calculus, right      [place] encephalitis      Umbilical hernia              Family History   Problem Relation Age of Onset     Cancer Father         melanoma     Glaucoma Mother      Acute Myocardial Infarction Mother      Coronary artery disease Maternal Grandfather      Coronary artery disease Paternal Grandfather      Social History     Socioeconomic History     Marital status:      Spouse name: Not on file     Number of children: Not on file     Years of education: Not on file     Highest education level: Not on file   Occupational History     Not on file   Social Needs     Financial resource strain: Not on file     Food insecurity:     Worry: Not on file     Inability: Not on file     Transportation needs:     Medical: Not on file     Non-medical: Not on file   Tobacco Use     Smoking status: Former Smoker     Packs/day: 2.00     Years: 25.00     Pack years: 50.00     Last attempt to quit: 1975     Years since quittin.3     Smokeless tobacco: Never Used     Tobacco comment: Quit    Substance and Sexual Activity     Alcohol use: No     Drug use: No     Sexual activity: Not on file   Lifestyle     Physical activity:     Days per week: Not on file     Minutes per session: Not on file     Stress: Not on file   Relationships     Social connections:     Talks on phone: Not on file     Gets together: Not on file     Attends Evangelical service: Not on file     Active member of club or organization: Not on file     Attends meetings of clubs or organizations: Not on file     Relationship status: Not on file     Intimate partner violence:     Fear of current or ex partner: Not on file     Emotionally abused: Not on file     Physically abused: Not on file     Forced sexual activity: Not on file   Other Topics Concern     Not on file   Social History Narrative    PhD in engineering.  Lives with his wife and adult children.  Reports that his son  assists him with his medications.         Review of Systems   Constitutional: Negative.    HENT: Negative.    Respiratory: Negative.    Cardiovascular: Negative.    Gastrointestinal: Positive for constipation.   Genitourinary: Negative.    Musculoskeletal: Positive for arthralgias.        LLE   Skin: Negative.        .  Vitals:    04/24/19 1025   BP: 110/68   Pulse: 70   Temp: 98.1  F (36.7  C)   SpO2: 96%   Weight: 162 lb (73.5 kg)       Physical Exam   Constitutional: He is oriented to person, place, and time. He appears well-developed and well-nourished.   HENT:   Head: Atraumatic.   Eyes: No scleral icterus.   Cardiovascular: Normal rate.   Murmur heard.  Pulmonary/Chest: No respiratory distress. He has no wheezes.   Abdominal: Soft. He exhibits no distension. There is no tenderness.   Musculoskeletal: He exhibits edema.   Trace. No redness, warmth, reproducible pain, can ROM both LE, ankles, knees.    Neurological: He is oriented to person, place, and time.   Skin: Skin is warm and dry.   Psychiatric:   Flat affect.         LABS:   Reviewed.    ASSESSMENT:      ICD-10-CM    1. Generalized weakness R53.1    2. Chronic pain of left lower extremity M79.605     G89.29        PLAN:    Start lidocaine patch to LLE on 12 hours off 12 hours prn daily.   Tylenol 650 q 6 hours prn for arthralgias.   If no improvement, update NP/MD.  Continue PT/OT as tolerated.     Electronically signed by: Caroline Jones CNP

## 2021-05-28 NOTE — PROGRESS NOTES
[facility]    Facility:   [facility] [[id number]]   Code Status: POLST AVAILABLE      Reevaluation of 85-year-old male with chronic major depression, limited ability to care for self, admitted to hospital with generalized weakness, hydronephrosis identified, urine culture negative, diagnosed with infected stone, stent in place, on vantin, transferred to TCU for rehabilitation and management of medical problems.    Review of systems: denies fever sweats or chills. Profound fatigue, questions cause of fatigue. Denies cardiac or pulmonary symptoms. Moderate constipation, continues senna S. Typically requires suppository every other day in home setting, request suppository at present. Mood stable. Believes strength is mildly improved. Remainder of 12 point review of systems obtained negative.    Exam: drowsy, in bed, no apparent distress, multiple questions, affect flat. No facial asymmetry. Mucous membranes moist. No pharyngeal erythema. S1 and S2 regular. Pulmonary exam without rales rhonchi or wheezes. Abdomen without masses tenderness organomegaly. Periphery without edema, trace venous stasis changes present. No hand drift.    Impression and plan:   infected ureteral stone, continues vantin, afebrile, urology follow-up pending in 48 hours.   Chronic major depression, continues Prozac and Ritalin.   Hypertension with adequate control of blood pressure.   Generalized weakness multifactorial, to continue rehabilitation. Multiple concerns are reviewed.    Electronically signed by: Luci Hrarell MD

## 2021-05-29 ENCOUNTER — RECORDS - HEALTHEAST (OUTPATIENT)
Dept: ADMINISTRATIVE | Facility: CLINIC | Age: 86
End: 2021-05-29

## 2021-05-30 ENCOUNTER — RECORDS - HEALTHEAST (OUTPATIENT)
Dept: ADMINISTRATIVE | Facility: CLINIC | Age: 86
End: 2021-05-30

## 2021-05-31 ENCOUNTER — RECORDS - HEALTHEAST (OUTPATIENT)
Dept: ADMINISTRATIVE | Facility: CLINIC | Age: 86
End: 2021-05-31

## 2021-06-02 ENCOUNTER — RECORDS - HEALTHEAST (OUTPATIENT)
Dept: ADMINISTRATIVE | Facility: CLINIC | Age: 86
End: 2021-06-02

## 2021-06-03 ENCOUNTER — RECORDS - HEALTHEAST (OUTPATIENT)
Dept: ADMINISTRATIVE | Facility: CLINIC | Age: 86
End: 2021-06-03

## 2021-06-03 VITALS — WEIGHT: 162 LBS | BODY MASS INDEX: 23.24 KG/M2

## 2021-06-03 VITALS — WEIGHT: 160 LBS | BODY MASS INDEX: 22.96 KG/M2

## 2021-06-11 NOTE — PROGRESS NOTES
"Per Dr. Marks's Office Visit Note dated 11/14/2016, \"We will need to monitor progression of aortic stenosis. Next echocardiogram should be performed in 6 months.\"    Order placed per protocol and  notified.   "

## 2021-06-19 NOTE — LETTER
Letter by Luci Harrell MD at      Author: Luci Harrell MD Service: -- Author Type: --    Filed:  Encounter Date: 4/23/2019 Status: (Other)         Patient: Felix Bangura   MR Number: [id number]   YOB: 1933   Date of Visit: 4/23/2019     [facility]    Facility:   [facility] [[id number]]   Code Status: POLST AVAILABLE      Reevaluation of 85-year-old male with chronic major depression, limited ability to care for self, admitted to hospital with generalized weakness, hydronephrosis identified, urine culture negative, diagnosed with infected stone, stent in place, on vantin, transferred to TCU for rehabilitation and management of medical problems.    Review of systems: denies fever sweats or chills. Profound fatigue, questions cause of fatigue. Denies cardiac or pulmonary symptoms. Moderate constipation, continues senna S. Typically requires suppository every other day in home setting, request suppository at present. Mood stable. Believes strength is mildly improved. Remainder of 12 point review of systems obtained negative.    Exam: drowsy, in bed, no apparent distress, multiple questions, affect flat. No facial asymmetry. Mucous membranes moist. No pharyngeal erythema. S1 and S2 regular. Pulmonary exam without rales rhonchi or wheezes. Abdomen without masses tenderness organomegaly. Periphery without edema, trace venous stasis changes present. No hand drift.    Impression and plan:   infected ureteral stone, continues vantin, afebrile, urology follow-up pending in 48 hours.   Chronic major depression, continues Prozac and Ritalin.   Hypertension with adequate control of blood pressure.   Generalized weakness multifactorial, to continue rehabilitation. Multiple concerns are reviewed.    Electronically signed by: Luci Harrell MD

## 2021-06-19 NOTE — LETTER
Letter by Caroline Jones CNP at      Author: Caroline Jones CNP Service: -- Author Type: --    Filed:  Encounter Date: 4/22/2019 Status: (Other)         Patient: Felix Bangura   MR Number: [id number]   YOB: 1933   Date of Visit: 4/22/2019     [facility]    Facility:   [facility] [[id number]]   Code Status: FULL CODE      CHIEF COMPLAINT/REASON FOR VISIT:  Chief Complaint   Patient presents with   ? Problem Visit       HISTORY:      HPI: Felix is a 85 y.o. male with past medical history significant for anxiety, depression, hypertension, hyperlipidemia, prostate cancer, [place] encephalitis, Guillain Barré who was most recently treated for evaluation of intermittent episodes of generalized weakness resulting in falls as well acute cystitis.   He is now in the TCU for gait training and strengthening.    Today; Seen for c/o LLE pain that is chronic. He has history of fracture with screws in place and he reports this pain occurs intermittently for the last 12 months. No redness, localized swelling, or reproducible pain. Can do passive ROM with no issues. Is still attending therapies. Reports he takes ibuprofen at home but here is requesting lidocaine patch.     UTI: Currently finishing up Cefpodoxime for UTI, he is asking for the results of the culture however have not received from discharging hospital.  Denies current symptoms.    Major depression: Patient takes Ritalin 10 daily for this has been for many years, will renew this while he is in the TCU.    Constipation: Patient was initially resistant to taking any medications for this however he is agreed to start senna S tablet given that he is quite bound up and ambulation is limited.       Past Medical History:   Diagnosis Date   ? Anhedonia     on ritalin   ? Ankle fracture     Left   ? Anxiety    ? Aortic stenosis    ? Arthritis    ? Cancer (H)     prostate   ? Claustrophobia    ? Depression    ?  First degree heart block    ? Generalized weakness    ? Guillain Barré syndrome (H)    ? Hard of hearing    ? Heart murmur    ? Hiatal hernia    ? History of pneumonia    ? History of UTI    ? Hydronephrosis    ? Hypercholesteremia    ? Hyperlipidemia    ? Hypertension    ? Insomnia    ? ANA (obstructive sleep apnea)    ? Renal calculus, right    ? [place] encephalitis    ? Umbilical hernia              Family History   Problem Relation Age of Onset   ? Cancer Father         melanoma   ? Glaucoma Mother    ? Acute Myocardial Infarction Mother    ? Coronary artery disease Maternal Grandfather    ? Coronary artery disease Paternal Grandfather      Social History     Socioeconomic History   ? Marital status:      Spouse name: Not on file   ? Number of children: Not on file   ? Years of education: Not on file   ? Highest education level: Not on file   Occupational History   ? Not on file   Social Needs   ? Financial resource strain: Not on file   ? Food insecurity:     Worry: Not on file     Inability: Not on file   ? Transportation needs:     Medical: Not on file     Non-medical: Not on file   Tobacco Use   ? Smoking status: Former Smoker     Packs/day: 2.00     Years: 25.00     Pack years: 50.00     Last attempt to quit: 1975     Years since quittin.3   ? Smokeless tobacco: Never Used   ? Tobacco comment: Quit 1976   Substance and Sexual Activity   ? Alcohol use: No   ? Drug use: No   ? Sexual activity: Not on file   Lifestyle   ? Physical activity:     Days per week: Not on file     Minutes per session: Not on file   ? Stress: Not on file   Relationships   ? Social connections:     Talks on phone: Not on file     Gets together: Not on file     Attends Jewish service: Not on file     Active member of club or organization: Not on file     Attends meetings of clubs or organizations: Not on file     Relationship status: Not on file   ? Intimate partner violence:     Fear of current or ex partner: Not  on file     Emotionally abused: Not on file     Physically abused: Not on file     Forced sexual activity: Not on file   Other Topics Concern   ? Not on file   Social History Narrative    PhD in engineering.  Lives with his wife and adult children.  Reports that his son assists him with his medications.         Review of Systems   Constitutional: Negative.    HENT: Negative.    Respiratory: Negative.    Cardiovascular: Negative.    Gastrointestinal: Positive for constipation.   Genitourinary: Negative.    Musculoskeletal: Positive for arthralgias.        LLE   Skin: Negative.        .  Vitals:    04/24/19 1025   BP: 110/68   Pulse: 70   Temp: 98.1  F (36.7  C)   SpO2: 96%   Weight: 162 lb (73.5 kg)       Physical Exam   Constitutional: He is oriented to person, place, and time. He appears well-developed and well-nourished.   HENT:   Head: Atraumatic.   Eyes: No scleral icterus.   Cardiovascular: Normal rate.   Murmur heard.  Pulmonary/Chest: No respiratory distress. He has no wheezes.   Abdominal: Soft. He exhibits no distension. There is no tenderness.   Musculoskeletal: He exhibits edema.   Trace. No redness, warmth, reproducible pain, can ROM both LE, ankles, knees.    Neurological: He is oriented to person, place, and time.   Skin: Skin is warm and dry.   Psychiatric:   Flat affect.         LABS:   Reviewed.    ASSESSMENT:      ICD-10-CM    1. Generalized weakness R53.1    2. Chronic pain of left lower extremity M79.605     G89.29        PLAN:    Start lidocaine patch to LLE on 12 hours off 12 hours prn daily.   Tylenol 650 q 6 hours prn for arthralgias.   If no improvement, update NP/MD.  Continue PT/OT as tolerated.     Electronically signed by: Caroline Jones, COLETTE

## 2021-06-19 NOTE — LETTER
Letter by Luci Harrell MD at      Author: Luci Harrell MD Service: -- Author Type: --    Filed:  Encounter Date: 4/16/2019 Status: (Other)         Patient: Felix Bangura   MR Number: [id number]   YOB: 1933   Date of Visit: 4/16/2019     [facility]    Facility:   [facility] [[id number]]   Code Status: POLST AVAILABLE    Admission evaluation to TCU of 85-year-old male. History is taken from accompanying hospital notes, patient is able to provide an adequate history. Long-standing hypertension, significant depression on Prozac and Ritalin, cared for by wife in home setting, admitted to hospital with weakness, presumed UTI and infected right stone with right hydroureter, stent placed, antibiotic intervention, transferred to TCU for rehabilitation and management of multiple medical problems.    Past medical history, current medical problem list, drug allergies, current medication list, social history, family history, code status reviewed in epic.    Review of systems: profound weakness. Desires to discuss all details of his hospitalization, questions when stent will be removed and procedure planned. Denies dysuria or hematuria. No active abdominal pain. No chest pain or palpitations. Questions why he is unable to function independently, states he does not bathe or brush his teeth independently, has not been able to do so since past ankle surgery. A number of concerns regarding personal issues express. Remainder of 12 point review of systems obtained negative. Additional discussion with son and wife in attendance.    Exam: vital signs reviewed since admission to TCU. In bed, articulate, oriented, strength diffusely diminished as is muscle tone. No facial asymmetry. Pharynx without erythema. No carotid bruits. S1 and S2 regular. Pulmonary exam without rales rhonchi or wheezes. Abdomen without masses tenderness organomegaly. Periphery without edema. Joints  without acute inflammatory change. No hand drift.    Impression and plan: infected right ureteral stone, afebrile, hydronephrosis, stent in place, anticipated follow up with surgery planned, remains afebrile, issues related to infected stone reviewed in detail, urine culture negative. Hypertension with adequate control of blood pressure. Chronic depression on Prozac and Ritalin, mood diminished with recent medical events. Chronic profound deconditioning multifactorial, rehabilitation indicated. Total time of admission evaluation greater than 60 minutes, greater than 50% of time spent in coordination of care and counseling, extensive discussion with patient wife and son. Additional discussion with nursing staff.      Electronically signed by: Luci Harrell MD

## 2021-06-19 NOTE — LETTER
Letter by Caroline Jones CNP at      Author: Caroline Jones CNP Service: -- Author Type: --    Filed:  Encounter Date: 4/15/2019 Status: (Other)         Patient: Felix Bangura   MR Number: [id number]   YOB: 1933   Date of Visit: 4/15/2019     [facility]    Facility:   [facility] [[id number]]   Code Status: FULL CODE      CHIEF COMPLAINT/REASON FOR VISIT:  Chief Complaint   Patient presents with   ? Review Of Multiple Medical Conditions       HISTORY:      HPI: Felix is a 85 y.o. male with past medical history significant for anxiety, depression, hypertension, hyperlipidemia, prostate cancer, [place] encephalitis, Guillain Barré who was most recently treated for evaluation of intermittent episodes of generalized weakness resulting in falls as well acute cystitis.   He is now in the TCU for gait training and strengthening.    UTI: Currently finishing up Cefpodoxime for UTI, he is asking for the results of the culture however have not received from discharging hospital.  Denies current symptoms.    Major depression: Patient takes Ritalin 10 daily for this has been for many years, will renew this while he is in the TCU.    Constipation: Patient was initially resistant to taking any medications for this however he is agreed to start senna S tablet given that he is quite bound up and ambulation is limited.       Past Medical History:   Diagnosis Date   ? Anhedonia     on ritalin   ? Ankle fracture     Left   ? Anxiety    ? Aortic stenosis    ? Arthritis    ? Cancer (H)     prostate   ? Claustrophobia    ? Depression    ? First degree heart block    ? Generalized weakness    ? Guillain Barré syndrome (H)    ? Hard of hearing    ? Heart murmur    ? Hiatal hernia    ? History of pneumonia    ? History of UTI    ? Hydronephrosis    ? Hypercholesteremia    ? Hyperlipidemia    ? Hypertension    ? Insomnia    ? ANA (obstructive sleep apnea)    ? Renal  calculus, right    ? [place] encephalitis    ? Umbilical hernia              Family History   Problem Relation Age of Onset   ? Cancer Father         melanoma   ? Glaucoma Mother    ? Acute Myocardial Infarction Mother    ? Coronary artery disease Maternal Grandfather    ? Coronary artery disease Paternal Grandfather      Social History     Socioeconomic History   ? Marital status:      Spouse name: Not on file   ? Number of children: Not on file   ? Years of education: Not on file   ? Highest education level: Not on file   Occupational History   ? Not on file   Social Needs   ? Financial resource strain: Not on file   ? Food insecurity:     Worry: Not on file     Inability: Not on file   ? Transportation needs:     Medical: Not on file     Non-medical: Not on file   Tobacco Use   ? Smoking status: Former Smoker     Packs/day: 2.00     Years: 25.00     Pack years: 50.00     Last attempt to quit: 1975     Years since quittin.3   ? Smokeless tobacco: Never Used   ? Tobacco comment: Quit    Substance and Sexual Activity   ? Alcohol use: No   ? Drug use: No   ? Sexual activity: Not on file   Lifestyle   ? Physical activity:     Days per week: Not on file     Minutes per session: Not on file   ? Stress: Not on file   Relationships   ? Social connections:     Talks on phone: Not on file     Gets together: Not on file     Attends Restorationism service: Not on file     Active member of club or organization: Not on file     Attends meetings of clubs or organizations: Not on file     Relationship status: Not on file   ? Intimate partner violence:     Fear of current or ex partner: Not on file     Emotionally abused: Not on file     Physically abused: Not on file     Forced sexual activity: Not on file   Other Topics Concern   ? Not on file   Social History Narrative    PhD in engineering.  Lives with his wife and adult children.  Reports that his son assists him with his medications.         Review of  Systems   Constitutional: Negative.    HENT: Negative.    Respiratory: Negative.    Cardiovascular: Negative.    Gastrointestinal: Positive for constipation.   Genitourinary: Negative.    Musculoskeletal: Negative.    Skin: Negative.        .  Vitals:    04/18/19 1352   BP: 133/80   Pulse: 67   Temp: 97.3  F (36.3  C)   SpO2: 93%   Weight: 160 lb (72.6 kg)       Physical Exam   Constitutional: He is oriented to person, place, and time. He appears well-developed and well-nourished.   HENT:   Head: Atraumatic.   Eyes: No scleral icterus.   Cardiovascular: Normal rate.   Murmur heard.  Pulmonary/Chest: No respiratory distress. He has no wheezes.   Abdominal: Soft. He exhibits no distension. There is no tenderness.   Musculoskeletal: He exhibits edema.   Neurological: He is oriented to person, place, and time.   Skin: Skin is warm and dry.   Psychiatric:   Flat affect.         LABS:   Reviewed.    ASSESSMENT:      ICD-10-CM    1. Generalized weakness R53.1    2. Prostate cancer (H) C61    3. Essential hypertension I10    4. Advanced care planning/counseling discussion Z71.89        PLAN:    Continue PT/OT as ordered.  BMP and CBC scheduled for Thursday.  Start senna S1 tab p.o. twice daily.  Increase fluids, fiber supplements and ambulation as able.  Admission history and physical per MD in the next week. At this time continue current care plan for all chronic medical conditions, as they are stable. Encouraged patient to engage in PT/OT for strengthening and conditioning. Encouraged patient to work closely with nursing staff to ensure any medical complaints are quickly addressed. Follow up this week or sooner if needed. Will continue to monitor patient and work with nursing staff collaboratively to work toward positive patient outcomes.      Total 35 minutes of which 50% was spent counseling and coordination of care of the above plan.    Electronically signed by: Caroline Jones CNP

## 2021-06-19 NOTE — LETTER
Letter by Luci Harrell MD at      Author: Luci Harrell MD Service: -- Author Type: --    Filed:  Encounter Date: 4/25/2019 Status: (Other)         Patient: Felix Bangura   MR Number: [id number]   YOB: 1933   Date of Visit: 4/25/2019     [facility]    Facility:   [facility] [[id number]]   Code Status: POLST AVAILABLE  85-year-old male is seen for follow up evaluation and discharge planning. History of hypertension, significant depression, adjustment disorder, chronic insomnia, cared for by wife and family members in home setting, admitted to hospital with weakness, right hydronephrosis present, urine culture negative, infected ureteral stone suspected, on Vantin, transferred to TCU for rehabilitation and management of medical problems. Course in TCU uncomplicated, afebrile throughout, urinary incontinence present, fatigue improving, patient reports he is discharging to home rather than continuing therapy in TCU.    Review of systems: patient states he is going home today or tomorrow. Has participated in physical therapy, has not yet performed stairs. Chronic constipation increasing, states he uses a suppository Q third day, currently on senna S PRN. Urinary incontinence troublesome to patient, worsened post recent hospitalization, no dysuria. Chronic insomnia, takes trazodone Q HS routinely, currently ordered PRN. Denies chest pain or palpitations. No fever sweats or chills. No focal neurologic symptoms. Energy level gradually improving. Was scheduled for urology appointment today which was canceled. Follow up appointment in approximately one week. Remainder of 12 point review of systems obtained negative.    Nursing staff report patient is progressing in therapy. Afebrile. Irritability which they attribute potentially to Ritalin, sleeps majority of time, in bed majority of time, appetite adequate.    Exam: vital signs reviewed over past 48 hours,  afebrile. Patient sitting upright in chair, in no apparent distress, affect flat, conversant and with multiple questions. No facial asymmetry. Oriented times three. Mucous membranes moist. No HJR. Thyroid midline and regular. S1 and S2 regular. Pulmonary exam without rales rhonchi or wheezes. Abdomen without masses tenderness organomegaly. Periphery without edema, trace venous stasis changes. Pedal pulses minus one and symmetrical. No hand drift.    Impression and plan:   infected ureteral stone, urology appointment canceled today, continue vantin times one week until urology follow-up.   Recent hydronephrosis, followed by urology.   Hypertension with adequate control of blood pressure.   Chronic depression on Prozac and Ritalin, affect remains flat, patient denies current depression.   Deconditioning, participating in physical therapy, strength gradually improving, has not performed stairs, patient however reports he is discharging to home within 24 hours. Discharge summary is therefore supplied with anticipated discharge in 24 hours, patient will require ongoing physical therapy and occupational therapy in home setting, no evidence of current infection, will continue vantin, discharge medications are as follows:  Current Outpatient Medications:   ?  amLODIPine (NORVASC) 5 MG tablet, Take 1 tablet (5 mg total) by mouth daily. For blood pressure., Disp: 30 tablet, Rfl: 0  ?  bicalutamide (CASODEX) 50 MG tablet, Take 50 mg by mouth daily., Disp: , Rfl:   ?  cholecalciferol, vitamin D3, 1,000 unit tablet, Take 2,000 Units by mouth daily., Disp: , Rfl:   ?  FLUoxetine (PROZAC) 20 MG capsule, Take 40 mg by mouth daily.    , Disp: , Rfl:   ?  melatonin 3 mg Tab tablet, Take 3 mg by mouth at bedtime., Disp: , Rfl:   ?  methylphenidate (RITALIN) 10 MG tablet, Take 1 tablet (10 mg total) by mouth 2 times daily before breakfast and lunch. For depression/anxiety, Disp: 60 tablet, Rfl: 0  ?  metoprolol succinate (TOPROL-XL) 25  MG, Take 1 tablet (25 mg total) by mouth daily. For blood pressure. (Patient taking differently: Take 12.5 mg by mouth daily. For blood pressure.    ), Disp: 30 tablet, Rfl: 0  ?  nitroglycerin (NITROSTAT) 0.4 MG SL tablet, Place 0.4 mg under the tongue every 5 (five) minutes as needed for chest pain., Disp: , Rfl:   ?  senna-docusate (SENNOSIDES-DOCUSATE SODIUM) 8.6-50 mg tablet, Take 1 tablet by mouth 2 (two) times a day., Disp: , Rfl:   ?  tamsulosin (FLOMAX) 0.4 mg cap, Take 0.4 mg by mouth Daily after breakfast., Disp: , Rfl:   ?  traZODone (DESYREL) 50 MG tablet, Take 25 mg by mouth at bedtime as needed.    , Disp: , Rfl:  senna S increase to two tablets b.i.d. with constipation present, change trazodone to 25 mg QHS routinely. Total time of discharge evaluation greater than 30 minutes, greater than 50% of time spent in coordination of care and counseling.        Electronically signed by: Luci Harrell MD
